# Patient Record
Sex: MALE | Race: WHITE | Employment: FULL TIME | URBAN - METROPOLITAN AREA
[De-identification: names, ages, dates, MRNs, and addresses within clinical notes are randomized per-mention and may not be internally consistent; named-entity substitution may affect disease eponyms.]

---

## 2019-03-08 LAB
ALBUMIN SERPL-MCNC: NORMAL G/DL
ALP BLD-CCNC: NORMAL U/L
ALT SERPL-CCNC: NORMAL U/L
ANION GAP SERPL CALCULATED.3IONS-SCNC: NORMAL MMOL/L
AST SERPL-CCNC: NORMAL U/L
BILIRUB SERPL-MCNC: NORMAL MG/DL
BUN BLDV-MCNC: NORMAL MG/DL
CALCIUM SERPL-MCNC: NORMAL MG/DL
CHLORIDE BLD-SCNC: NORMAL MMOL/L
CHOLESTEROL, TOTAL: NORMAL
CHOLESTEROL/HDL RATIO: NORMAL
CO2: NORMAL
CREAT SERPL-MCNC: NORMAL MG/DL
GFR CALCULATED: NORMAL
GLUCOSE BLD-MCNC: NORMAL MG/DL
HDLC SERPL-MCNC: NORMAL MG/DL
LDL CHOLESTEROL CALCULATED: NORMAL
POTASSIUM SERPL-SCNC: NORMAL MMOL/L
SODIUM BLD-SCNC: NORMAL MMOL/L
TOTAL PROTEIN: NORMAL
TRIGL SERPL-MCNC: NORMAL MG/DL
VLDLC SERPL CALC-MCNC: NORMAL MG/DL

## 2019-05-21 ENCOUNTER — OFFICE VISIT (OUTPATIENT)
Dept: PODIATRY | Age: 59
End: 2019-05-21
Payer: COMMERCIAL

## 2019-05-21 VITALS
TEMPERATURE: 97.8 F | DIASTOLIC BLOOD PRESSURE: 78 MMHG | SYSTOLIC BLOOD PRESSURE: 139 MMHG | BODY MASS INDEX: 27.17 KG/M2 | HEIGHT: 73 IN | WEIGHT: 205 LBS

## 2019-05-21 DIAGNOSIS — M79.675 PAIN IN LEFT TOE(S): Primary | ICD-10-CM

## 2019-05-21 DIAGNOSIS — M89.8X7 EXOSTOSIS OF BONE OF FOOT: ICD-10-CM

## 2019-05-21 DIAGNOSIS — M79.674 PAIN IN TOE OF RIGHT FOOT: ICD-10-CM

## 2019-05-21 DIAGNOSIS — L84 CORN OR CALLUS: ICD-10-CM

## 2019-05-21 PROCEDURE — 99203 OFFICE O/P NEW LOW 30 MIN: CPT | Performed by: PODIATRIST

## 2019-05-21 RX ORDER — AMMONIUM LACTATE 12 G/100G
LOTION TOPICAL
Qty: 222 BOTTLE | Refills: 1 | Status: SHIPPED | OUTPATIENT
Start: 2019-05-21

## 2019-05-21 ASSESSMENT — ENCOUNTER SYMPTOMS
EYES NEGATIVE: 1
RESPIRATORY NEGATIVE: 1

## 2019-05-21 NOTE — PROGRESS NOTES
[address] [facility] PODIATRY  [address]  [facility] [address]  Dept: [phone]  Dept Fax: [phone]    NEW PATIENT PROGRESS NOTE  Date of patient's visit: 5/21/2019  Patient's Name:  Piter Matson YOB: 1960            Patient Care Team:  Jose Song MD as PCP - General (Internal Medicine)        Chief Complaint   Patient presents with    Toe Pain     new pt referred by Dr. Rk Medrano for toe pain spin callous's b/l         HPI:   Piter Matson is a 66 y.o. male who presents to the office today complaining of patient states that both large toes are painful this is been painful for many years patient states that he stands at a [employer] shop wearing steel toed shoes, noted pain mostly after he's been wearing boots and standing. Symptoms began year year(s) ago. Patient relates pain is Present. Pain is rated 10 out of 10 and is described as intermittent. Treatments prior to today's visit include: none. Currently denies F/C/N/V. Pt's primary care physician is Jose Song MD last seen3-6-18     No Known Allergies    Past Medical History:   Diagnosis Date    Skin lesion     perianal       Prior to Admission medications    Medication Sig Start Date End Date Taking? Authorizing Provider   ammonium lactate (LAC-HYDRIN) 12 % lotion Apply topically daily. 5/21/19  Yes Jin Rankin DPM   senna-docusate (Har[place] Bound) 8.6-50 MG per tablet Take 2 tablets by mouth daily as needed for Constipation (Take with pain medication to avoid constipation) 2/7/17   Lamonte Miles MD       Past Surgical History:   Procedure Laterality Date    COLONOSCOPY      OTHER SURGICAL HISTORY  02/07/2017    excision perianal skin lesion    PILONIDAL CYST EXCISION      WISDOM TOOTH EXTRACTION         No family history on file.     Social History     Tobacco Use    Smoking status: Current Some Day Smoker     Types: Cigars    Smokeless tobacco: lesion/ulceration Present. Skin No rashes or nodules noted. .   Musculoskeletal:     1st MPJ ROM within normal limits, Bilateral.  Muscle strength 5/5, Bilateral.  Pain present upon palpation of bilateral hallux, Bilateral. within normal limits medial longitudinal arch, Bilateral.  Ankle ROM within normal limits,Bilateral.    Dorsally contracted digits absent digits 0, Bilateral.   Vascular: DP and PT pulses palpable 4/4, Bilateral.  CFT <2 seconds, Bilateral.  Hair growth present to the level of the digits, Bilateral.  Edema absent, Bilateral.  Varicosities absent, Bilateral. Erythema present, Bilateral      Radiographs:      Asessment: Patient is a 66 y.o. male with:    Diagnosis Orders   1. Pain in left toe(s)     2. Pain in toe of right foot     3. Corn or callus     4. Exostosis of bone of foot         Plan: Patient examined and evaluated. Current condition and treatment options discussed in detail. Discussed conservative and surgical options with the patient. Advised pt to wear a wide shoe today we divided the lesions down to good healthy tissue I prescribed Lac-Hydrin cream to be applied daily along with the mole skin  . Verbal and written instructions given to patient. Contact office with any questions/problems/concerns.   RTC in 2month(s).    5/21/2019    Electronically signed by Erso Littlejohn DPM on 5/21/2019 at 5:36 PM  5/21/2019

## 2019-07-23 ENCOUNTER — PROCEDURE VISIT (OUTPATIENT)
Dept: PODIATRY | Age: 59
End: 2019-07-23
Payer: COMMERCIAL

## 2019-07-23 VITALS
SYSTOLIC BLOOD PRESSURE: 146 MMHG | HEIGHT: 73 IN | WEIGHT: 200.4 LBS | TEMPERATURE: 96.3 F | BODY MASS INDEX: 26.56 KG/M2 | DIASTOLIC BLOOD PRESSURE: 70 MMHG

## 2019-07-23 DIAGNOSIS — M79.675 PAIN IN LEFT TOE(S): Primary | ICD-10-CM

## 2019-07-23 DIAGNOSIS — M79.674 PAIN IN TOE OF RIGHT FOOT: ICD-10-CM

## 2019-07-23 PROCEDURE — 99213 OFFICE O/P EST LOW 20 MIN: CPT | Performed by: PODIATRIST

## 2019-09-24 ENCOUNTER — OFFICE VISIT (OUTPATIENT)
Dept: PODIATRY | Age: 59
End: 2019-09-24
Payer: COMMERCIAL

## 2019-09-24 VITALS
TEMPERATURE: 98.2 F | WEIGHT: 204 LBS | DIASTOLIC BLOOD PRESSURE: 64 MMHG | BODY MASS INDEX: 27.04 KG/M2 | SYSTOLIC BLOOD PRESSURE: 122 MMHG | HEIGHT: 73 IN

## 2019-09-24 DIAGNOSIS — M79.674 PAIN IN TOE OF RIGHT FOOT: ICD-10-CM

## 2019-09-24 DIAGNOSIS — L84 CORN OR CALLUS: ICD-10-CM

## 2019-09-24 DIAGNOSIS — M89.8X7 EXOSTOSIS OF BONE OF FOOT: ICD-10-CM

## 2019-09-24 DIAGNOSIS — M79.675 PAIN IN LEFT TOE(S): Primary | ICD-10-CM

## 2019-09-24 PROCEDURE — 99213 OFFICE O/P EST LOW 20 MIN: CPT | Performed by: PODIATRIST

## 2019-09-24 NOTE — PROGRESS NOTES
moleskin foam padding and an orthotic    Return in about 3 months (around 12/24/2019). Seen By:  Natalia Peoples DPM      Document was created using voice recognition software. Note was reviewed, however may contain grammatical errors.

## 2019-11-26 ENCOUNTER — PROCEDURE VISIT (OUTPATIENT)
Dept: PODIATRY | Age: 59
End: 2019-11-26
Payer: COMMERCIAL

## 2019-11-26 VITALS
TEMPERATURE: 97.8 F | BODY MASS INDEX: 27.57 KG/M2 | DIASTOLIC BLOOD PRESSURE: 74 MMHG | SYSTOLIC BLOOD PRESSURE: 136 MMHG | WEIGHT: 208 LBS | HEIGHT: 73 IN

## 2019-11-26 DIAGNOSIS — M79.675 PAIN IN LEFT TOE(S): Primary | ICD-10-CM

## 2019-11-26 DIAGNOSIS — M89.8X7 EXOSTOSIS OF BONE OF FOOT: ICD-10-CM

## 2019-11-26 DIAGNOSIS — L84 CORN OR CALLUS: ICD-10-CM

## 2019-11-26 DIAGNOSIS — M79.674 PAIN IN TOE OF RIGHT FOOT: ICD-10-CM

## 2019-11-26 PROCEDURE — 99213 OFFICE O/P EST LOW 20 MIN: CPT | Performed by: PODIATRIST

## 2020-02-25 ENCOUNTER — PROCEDURE VISIT (OUTPATIENT)
Dept: PODIATRY | Age: 60
End: 2020-02-25
Payer: COMMERCIAL

## 2020-02-25 VITALS
BODY MASS INDEX: 26.56 KG/M2 | SYSTOLIC BLOOD PRESSURE: 139 MMHG | DIASTOLIC BLOOD PRESSURE: 82 MMHG | WEIGHT: 207 LBS | HEIGHT: 74 IN | TEMPERATURE: 96.9 F

## 2020-02-25 PROCEDURE — 99213 OFFICE O/P EST LOW 20 MIN: CPT | Performed by: PODIATRIST

## 2020-02-25 NOTE — PROGRESS NOTES
19  Juana Confer : 1960 Sex: male  Age: 64 y.o. Patient was referred by Ryan Lares MD    Chief Complaint   Patient presents with   Bernerd Foot     saw PCP DR Izaiah Yarbrough on 2019       HPI: pt is seen for follow-up of painful feet patient has multiple areas that are sore including the great toes and plantar aspect heels patient has been using moleskin that helps a lot    ROS:  Const: Denies constitutional symptoms  Musculo: Denies symptoms other than stated above  Skin: Denies symptoms other than stated above       Current Outpatient Medications:     ammonium lactate (LAC-HYDRIN) 12 % lotion, Apply topically daily. , Disp: 222 Bottle, Rfl: 1  No Known Allergies    Past Medical History:   Diagnosis Date    Skin lesion     perianal     Past Surgical History:   Procedure Laterality Date    COLONOSCOPY      NOSE SURGERY      deviated septum     OTHER SURGICAL HISTORY  2017    excision perianal skin lesion    PILONIDAL CYST EXCISION      WISDOM TOOTH EXTRACTION       Family History   Problem Relation Age of Onset    High Blood Pressure Mother     High Cholesterol Mother     Coronary Art Dis Father     High Blood Pressure Father     High Cholesterol Father     Diabetes Father         Insulin Dependent      Social History     Socioeconomic History    Marital status:      Spouse name: Not on file    Number of children: Not on file    Years of education: Not on file    Highest education level: Not on file   Occupational History    Not on file   Social Needs    Financial resource strain: Not on file    Food insecurity:     Worry: Not on file     Inability: Not on file    Transportation needs:     Medical: Not on file     Non-medical: Not on file   Tobacco Use    Smoking status: Current Some Day Smoker     Types: Cigars    Smokeless tobacco: Current User     Types: Chew   Substance and Sexual Activity    Alcohol use: Yes     Comment: occ    Drug use: No    Sexual activity: Not on file   Lifestyle    Physical activity:     Days per week: Not on file     Minutes per session: Not on file    Stress: Not on file   Relationships    Social connections:     Talks on phone: Not on file     Gets together: Not on file     Attends Yazidism service: Not on file     Active member of club or organization: Not on file     Attends meetings of clubs or organizations: Not on file     Relationship status: Not on file    Intimate partner violence:     Fear of current or ex partner: Not on file     Emotionally abused: Not on file     Physically abused: Not on file     Forced sexual activity: Not on file   Other Topics Concern    Not on file   Social History Narrative    Not on file       Vitals:    02/25/20 1605   BP: 139/82   Temp: 96.9 °F (36.1 °C)   TempSrc: Temporal   Weight: 207 lb (93.9 kg)   Height: 6' 2\" (1.88 m)       Focused Lower Extremity Physical Exam:  Vitals:    02/25/20 1605   BP: 139/82   Temp: 96.9 °F (36.1 °C)        Foot Exam    General  General Appearance: appears stated age and healthy   Orientation: alert and oriented to person, place, and time       Right Foot/Ankle     Inspection and Palpation  Tenderness: great toe interphalangeal joint, great toe metatarsophalangeal joint, lesser metatarsophalangeal joints and metatarsals   Hallux valgus: yes  Skin Exam: callus; Neurovascular  Dorsalis pedis: 3+  Posterior tibial: 3+  Saphenous nerve sensation: normal  Tibial nerve sensation: normal  Superficial peroneal nerve sensation: normal  Deep peroneal nerve sensation: normal  Sural nerve sensation: normal      Left Foot/Ankle      Inspection and Palpation  Tenderness: great toe interphalangeal joint, great toe metatarsophalangeal joint, lesser metatarsophalangeal joints and metatarsals   Hallux valgus: yes  Skin Exam: callus;      Neurovascular  Dorsalis pedis: 3+  Posterior tibial: 3+  Saphenous nerve sensation: normal  Tibial nerve sensation: normal  Superficial peroneal nerve sensation: normal  Deep peroneal nerve sensation: normal  Sural nerve sensation: normal             Ortho Exam    Vascular: pulses  dp  pt    Capillary Refill Time:   Hair growth  Skin:    Edema:    Neurologic:      Musculoskeletal/ Orthopedic examination: Pain with palpation to the first metatarsal phalangeal joint pain with palpation to the interphalangeal joints bilaterally there is a dry callus plantar aspect of bilateral heels and sub-1 bilaterally  NAIL   Web space   Derm:          Assessment and Plan:  I had a long discussion today with the patient about the likely diagnosis and its natural history, physical exam and imaging findings, as well as treatment options. We discussed both surgical and nonsurgical treatments, including risks and benefits. From a nonoperative standpoint, we discussed rest/activity modification, NSAIDs/Acetaminophen/topical anesthetics, orthotics, bracing/immobilization, and physical therapy. After conservative treatment has failed patient agrees and would like to have surgery today we reviewed all risk and benefits all complications including anesthesia infection further surgery over under correction patient agrees to proceed with surgery in the future at [facility] was seen today for callouses. Diagnoses and all orders for this visit:    Pain in left toe(s)    Pain in toe of right foot    Corn or callus    Exostosis of bone of foot    Today I debrided the calluses of the patient is to continue with moleskin foam padding and an orthotic    Return in about 4 months (around 6/25/2020). Seen By:  Caity Ferrell DPM      Document was created using voice recognition software. Note was reviewed, however may contain grammatical errors.

## 2020-03-05 ENCOUNTER — HOSPITAL ENCOUNTER (EMERGENCY)
Age: 60
Discharge: HOME | End: 2020-03-05
Attending: EMERGENCY MEDICINE
Payer: COMMERCIAL

## 2020-03-05 ENCOUNTER — OFFICE VISIT (OUTPATIENT)
Dept: PRIMARY CARE CLINIC | Age: 60
End: 2020-03-05
Payer: COMMERCIAL

## 2020-03-05 ENCOUNTER — APPOINTMENT (OUTPATIENT)
Dept: GENERAL RADIOLOGY | Age: 60
End: 2020-03-05
Payer: COMMERCIAL

## 2020-03-05 VITALS
BODY MASS INDEX: 25.72 KG/M2 | DIASTOLIC BLOOD PRESSURE: 68 MMHG | OXYGEN SATURATION: 98 % | HEART RATE: 98 BPM | SYSTOLIC BLOOD PRESSURE: 128 MMHG | WEIGHT: 200.4 LBS | TEMPERATURE: 101.3 F | RESPIRATION RATE: 18 BRPM | HEIGHT: 74 IN

## 2020-03-05 VITALS
TEMPERATURE: 98.9 F | BODY MASS INDEX: 26.51 KG/M2 | HEIGHT: 73 IN | WEIGHT: 200 LBS | DIASTOLIC BLOOD PRESSURE: 60 MMHG | OXYGEN SATURATION: 98 % | SYSTOLIC BLOOD PRESSURE: 148 MMHG | HEART RATE: 84 BPM | RESPIRATION RATE: 16 BRPM

## 2020-03-05 LAB
ANION GAP SERPL CALCULATED.3IONS-SCNC: 12 MMOL/L (ref 7–16)
BASOPHILS ABSOLUTE: 0.02 E9/L (ref 0–0.2)
BASOPHILS RELATIVE PERCENT: 0.3 % (ref 0–2)
BUN BLDV-MCNC: 20 MG/DL (ref 6–20)
CALCIUM SERPL-MCNC: 8.5 MG/DL (ref 8.6–10.2)
CHLORIDE BLD-SCNC: 103 MMOL/L (ref 98–107)
CO2: 24 MMOL/L (ref 22–29)
CREAT SERPL-MCNC: 1.2 MG/DL (ref 0.7–1.2)
EKG ATRIAL RATE: 82 BPM
EKG P AXIS: 64 DEGREES
EKG P-R INTERVAL: 160 MS
EKG Q-T INTERVAL: 352 MS
EKG QRS DURATION: 88 MS
EKG QTC CALCULATION (BAZETT): 411 MS
EKG T AXIS: 48 DEGREES
EKG VENTRICULAR RATE: 82 BPM
EOSINOPHILS ABSOLUTE: 0 E9/L (ref 0.05–0.5)
EOSINOPHILS RELATIVE PERCENT: 0 % (ref 0–6)
GFR AFRICAN AMERICAN: >60
GFR NON-AFRICAN AMERICAN: >60 ML/MIN/1.73
GLUCOSE BLD-MCNC: 104 MG/DL (ref 74–99)
HCT VFR BLD CALC: 43.1 % (ref 37–54)
HEMOGLOBIN: 14.1 G/DL (ref 12.5–16.5)
IMMATURE GRANULOCYTES #: 0.02 E9/L
IMMATURE GRANULOCYTES %: 0.3 % (ref 0–5)
INFLUENZA A ANTIGEN, POC: POSITIVE
INFLUENZA B ANTIGEN, POC: ABNORMAL
LYMPHOCYTES ABSOLUTE: 0.79 E9/L (ref 1.5–4)
LYMPHOCYTES RELATIVE PERCENT: 10.3 % (ref 20–42)
MCH RBC QN AUTO: 30.8 PG (ref 26–35)
MCHC RBC AUTO-ENTMCNC: 32.7 % (ref 32–34.5)
MCV RBC AUTO: 94.1 FL (ref 80–99.9)
MONOCYTES ABSOLUTE: 0.82 E9/L (ref 0.1–0.95)
MONOCYTES RELATIVE PERCENT: 10.6 % (ref 2–12)
NEUTROPHILS ABSOLUTE: 6.05 E9/L (ref 1.8–7.3)
NEUTROPHILS RELATIVE PERCENT: 78.5 % (ref 43–80)
PDW BLD-RTO: 12 FL (ref 11.5–15)
PLATELET # BLD: 180 E9/L (ref 130–450)
PMV BLD AUTO: 9.6 FL (ref 7–12)
POTASSIUM SERPL-SCNC: 4.3 MMOL/L (ref 3.5–5)
RBC # BLD: 4.58 E12/L (ref 3.8–5.8)
SODIUM BLD-SCNC: 139 MMOL/L (ref 132–146)
TROPONIN: <0.01 NG/ML (ref 0–0.03)
WBC # BLD: 7.7 E9/L (ref 4.5–11.5)

## 2020-03-05 PROCEDURE — 99284 EMERGENCY DEPT VISIT MOD MDM: CPT

## 2020-03-05 PROCEDURE — 96374 THER/PROPH/DIAG INJ IV PUSH: CPT

## 2020-03-05 PROCEDURE — 85025 COMPLETE CBC W/AUTO DIFF WBC: CPT

## 2020-03-05 PROCEDURE — 93005 ELECTROCARDIOGRAM TRACING: CPT | Performed by: EMERGENCY MEDICINE

## 2020-03-05 PROCEDURE — 96372 THER/PROPH/DIAG INJ SC/IM: CPT | Performed by: INTERNAL MEDICINE

## 2020-03-05 PROCEDURE — 99213 OFFICE O/P EST LOW 20 MIN: CPT | Performed by: INTERNAL MEDICINE

## 2020-03-05 PROCEDURE — 71045 X-RAY EXAM CHEST 1 VIEW: CPT

## 2020-03-05 PROCEDURE — 6360000002 HC RX W HCPCS: Performed by: EMERGENCY MEDICINE

## 2020-03-05 PROCEDURE — 84484 ASSAY OF TROPONIN QUANT: CPT

## 2020-03-05 PROCEDURE — 87804 INFLUENZA ASSAY W/OPTIC: CPT | Performed by: INTERNAL MEDICINE

## 2020-03-05 PROCEDURE — 2580000003 HC RX 258: Performed by: EMERGENCY MEDICINE

## 2020-03-05 PROCEDURE — 80048 BASIC METABOLIC PNL TOTAL CA: CPT

## 2020-03-05 PROCEDURE — 6370000000 HC RX 637 (ALT 250 FOR IP): Performed by: EMERGENCY MEDICINE

## 2020-03-05 RX ORDER — 0.9 % SODIUM CHLORIDE 0.9 %
1000 INTRAVENOUS SOLUTION INTRAVENOUS ONCE
Status: COMPLETED | OUTPATIENT
Start: 2020-03-05 | End: 2020-03-05

## 2020-03-05 RX ORDER — OSELTAMIVIR PHOSPHATE 75 MG/1
75 CAPSULE ORAL 2 TIMES DAILY
Qty: 10 CAPSULE | Refills: 0 | Status: SHIPPED | OUTPATIENT
Start: 2020-03-05 | End: 2020-03-10

## 2020-03-05 RX ORDER — OSELTAMIVIR PHOSPHATE 75 MG/1
75 CAPSULE ORAL ONCE
Status: COMPLETED | OUTPATIENT
Start: 2020-03-05 | End: 2020-03-05

## 2020-03-05 RX ORDER — KETOROLAC TROMETHAMINE 30 MG/ML
30 INJECTION, SOLUTION INTRAMUSCULAR; INTRAVENOUS ONCE
Status: COMPLETED | OUTPATIENT
Start: 2020-03-05 | End: 2020-03-05

## 2020-03-05 RX ORDER — SODIUM CHLORIDE 0.9 % (FLUSH) 0.9 %
10 SYRINGE (ML) INJECTION PRN
Status: DISCONTINUED | OUTPATIENT
Start: 2020-03-05 | End: 2020-03-05 | Stop reason: HOSPADM

## 2020-03-05 RX ORDER — LORAZEPAM 1 MG/1
TABLET ORAL DAILY PRN
COMMUNITY
Start: 2019-12-09

## 2020-03-05 RX ORDER — METHYLPREDNISOLONE ACETATE 40 MG/ML
40 INJECTION, SUSPENSION INTRA-ARTICULAR; INTRALESIONAL; INTRAMUSCULAR; SOFT TISSUE ONCE
Status: COMPLETED | OUTPATIENT
Start: 2020-03-05 | End: 2020-03-05

## 2020-03-05 RX ADMIN — SODIUM CHLORIDE 1000 ML: 9 INJECTION, SOLUTION INTRAVENOUS at 12:19

## 2020-03-05 RX ADMIN — KETOROLAC TROMETHAMINE 30 MG: 30 INJECTION, SOLUTION INTRAMUSCULAR; INTRAVENOUS at 13:52

## 2020-03-05 RX ADMIN — METHYLPREDNISOLONE ACETATE 40 MG: 40 INJECTION, SUSPENSION INTRA-ARTICULAR; INTRALESIONAL; INTRAMUSCULAR; SOFT TISSUE at 10:38

## 2020-03-05 RX ADMIN — OSELTAMIVIR PHOSPHATE 75 MG: 75 CAPSULE ORAL at 13:52

## 2020-03-05 ASSESSMENT — PATIENT HEALTH QUESTIONNAIRE - PHQ9
2. FEELING DOWN, DEPRESSED OR HOPELESS: 0
SUM OF ALL RESPONSES TO PHQ QUESTIONS 1-9: 0
SUM OF ALL RESPONSES TO PHQ QUESTIONS 1-9: 0
1. LITTLE INTEREST OR PLEASURE IN DOING THINGS: 0
SUM OF ALL RESPONSES TO PHQ9 QUESTIONS 1 & 2: 0

## 2020-03-05 ASSESSMENT — PAIN SCALES - GENERAL: PAINLEVEL_OUTOF10: 2

## 2020-03-05 NOTE — ED PROVIDER NOTES
Department of Emergency Medicine   ED  Provider Note  Admit Date/RoomTime: 3/5/2020 11:07 AM  ED Room: RANI/RANI      History of Present Illness:  3/5/20, Time: 11:58 AM         Su Perez is a 64 y.o. male presenting to the ED for loss of consciousness, beginning this morning. The complaint has been persistent, moderate in severity, and worsened by nothing. Pt comes to the ED after being at urgent care this morning where he felt lightheaded and dizzy and then had two syncopic episodes. Pt has tested positive for influenza A and has a cough and a fever. Pt denies chest pain, shortness of breath, chills, abdominal pain, nausea, emesis, diarrhea, constipation, urinary symptoms or further complaints at this time. Review of Systems:   Pertinent positives and negatives are stated within HPI, all other systems reviewed and are negative.    --------------------------------------------- PAST HISTORY ---------------------------------------------  Past Medical History:  has a past medical history of Skin lesion. Past Surgical History:  has a past surgical history that includes [place] tooth extraction; Pilonidal cyst excision; Colonoscopy; other surgical history (02/07/2017); and Nose surgery. Social History:  reports that he has never smoked. He has never used smokeless tobacco. He reports current alcohol use. He reports that he does not use drugs. Family History: family history includes Coronary Art Dis in his father; Diabetes in his father; High Blood Pressure in his father and mother; High Cholesterol in his father and mother. The patients home medications have been reviewed. Allergies: Patient has no known allergies.     ---------------------------------------------------PHYSICAL EXAM--------------------------------------    Constitutional/General: Alert and oriented x3, ill-appearing, febrile  Head: Normocephalic and atraumatic  Eyes: PERRL, EOMI, conjunctiva normal, sclera non icteric  Respiratory: Lungs clear to auscultation bilaterally, no wheezes, rales, or rhonchi. Not in respiratory distress  Cardiovascular:  Regular rate. Regular rhythm. No murmurs, gallops, or rubs. 2+ distal pulses  Chest: No chest wall tenderness  GI:  Abdomen Soft, Non tender, Non distended. +BS. No rebound, guarding, or rigidity. No pulsatile masses. Musculoskeletal: Moves all extremities x 4. Warm and well perfused, no clubbing, cyanosis, or edema. Integument: Skin warm and dry. No rashes. Neurologic: GCS 15, no focal deficits, symmetric strength 5/5 in the upper and lower extremities bilaterally    -------------------------------------------------- RESULTS -------------------------------------------------  I have personally reviewed all laboratory and imaging results for this patient. Results are listed below.      LABS:  Results for orders placed or performed during the hospital encounter of 03/05/20   CBC Auto Differential   Result Value Ref Range    WBC 7.7 4.5 - 11.5 E9/L    RBC 4.58 3.80 - 5.80 E12/L    Hemoglobin 14.1 12.5 - 16.5 g/dL    Hematocrit 43.1 37.0 - 54.0 %    MCV 94.1 80.0 - 99.9 fL    MCH 30.8 26.0 - 35.0 pg    MCHC 32.7 32.0 - 34.5 %    RDW 12.0 11.5 - 15.0 fL    Platelets 237 854 - 882 E9/L    MPV 9.6 7.0 - 12.0 fL    Neutrophils % 78.5 43.0 - 80.0 %    Immature Granulocytes % 0.3 0.0 - 5.0 %    Lymphocytes % 10.3 (L) 20.0 - 42.0 %    Monocytes % 10.6 2.0 - 12.0 %    Eosinophils % 0.0 0.0 - 6.0 %    Basophils % 0.3 0.0 - 2.0 %    Neutrophils Absolute 6.05 1.80 - 7.30 E9/L    Immature Granulocytes # 0.02 E9/L    Lymphocytes Absolute 0.79 (L) 1.50 - 4.00 E9/L    Monocytes Absolute 0.82 0.10 - 0.95 E9/L    Eosinophils Absolute 0.00 (L) 0.05 - 0.50 E9/L    Basophils Absolute 0.02 0.00 - 0.20 W6/I   Basic Metabolic Panel   Result Value Ref Range    Sodium 139 132 - 146 mmol/L    Potassium 4.3 3.5 - 5.0 mmol/L    Chloride 103 98 - 107 mmol/L    CO2 24 22 - 29 mmol/L    Anion Gap 12 7 - 16 mmol/L    Glucose 104 (H) 74 - 99 mg/dL    BUN 20 6 - 20 mg/dL    CREATININE 1.2 0.7 - 1.2 mg/dL    GFR Non-African American >60 >=60 mL/min/1.73    GFR African American >60     Calcium 8.5 (L) 8.6 - 10.2 mg/dL   Troponin   Result Value Ref Range    Troponin <0.01 0.00 - 0.03 ng/mL   EKG 12 Lead   Result Value Ref Range    Ventricular Rate 82 BPM    Atrial Rate 82 BPM    P-R Interval 160 ms    QRS Duration 88 ms    Q-T Interval 352 ms    QTc Calculation (Bazett) 411 ms    P Axis 64 degrees    T Axis 48 degrees       RADIOLOGY:  Interpreted by Radiologist.  XR CHEST PORTABLE   Final Result      NO ACUTE CARDIOPULMONARY PROCESS           ------------------------- NURSING NOTES AND VITALS REVIEWED ---------------------------   The nursing notes within the ED encounter and vital signs as below have been reviewed by myself. BP (!) 148/60   Pulse 84   Temp 98.9 °F (37.2 °C) (Oral)   Resp 16   Ht 6' 1\" (1.854 m)   Wt 200 lb (90.7 kg)   SpO2 98%   BMI 26.39 kg/m²   Oxygen Saturation Interpretation: Normal    The patients available past medical records and past encounters were reviewed. ------------------------------ ED COURSE/MEDICAL DECISION MAKING----------------------  Medications   0.9 % sodium chloride bolus (0 mLs Intravenous Stopped 3/5/20 1418)   0.9 % sodium chloride bolus (0 mLs Intravenous Stopped 3/5/20 1418)   ketorolac (TORADOL) injection 30 mg (30 mg Intravenous Given 3/5/20 1352)   oseltamivir (TAMIFLU) capsule 75 mg (75 mg Oral Given 3/5/20 1352)       Medical Decision Making:    Patient was seen and examined. Labs and imaging were evaluated. Patient already had diagnosis of influenza a at urgent care and had a syncopal episode after he became lightheaded at the registration desk when he was leaving. Patient is febrile here in the department Toradol was given as well as an initial dose of Tamiflu. Patient was orthostatic positive and IV fluids were given.   Patient was able to ambulate

## 2020-03-05 NOTE — LETTER
[facility] PRIMARY CARE  [address]  Phone: [phone]  Fax: [phone]    Ellen Foster MD        March 5, 2020     Patient: Amari Potter   YOB: 1960   Date of Visit: 3/5/2020       To Whom It May Concern: It is my medical opinion that Ramilamary Adama may return to full duty with no restrictions on 3/11/2020    If you have any questions or concerns, please don't hesitate to call.     Sincerely,        Ellen Foster MD

## 2020-03-05 NOTE — ED NOTES
Bed: 18  Expected date:   Expected time:   Means of arrival:   Comments:  Kam Chiu RN  03/05/20 8996

## 2020-03-05 NOTE — PROGRESS NOTES
Not on file    Number of children: Not on file    Years of education: Not on file    Highest education level: Not on file   Occupational History    Not on file   Social Needs    Financial resource strain: Not on file    Food insecurity:     Worry: Not on file     Inability: Not on file    Transportation needs:     Medical: Not on file     Non-medical: Not on file   Tobacco Use    Smoking status: Never Smoker    Smokeless tobacco: Never Used   Substance and Sexual Activity    Alcohol use: Yes     Comment: occ    Drug use: No    Sexual activity: Not on file   Lifestyle    Physical activity:     Days per week: Not on file     Minutes per session: Not on file    Stress: Not on file   Relationships    Social connections:     Talks on phone: Not on file     Gets together: Not on file     Attends Yarsani service: Not on file     Active member of club or organization: Not on file     Attends meetings of clubs or organizations: Not on file     Relationship status: Not on file    Intimate partner violence:     Fear of current or ex partner: Not on file     Emotionally abused: Not on file     Physically abused: Not on file     Forced sexual activity: Not on file   Other Topics Concern    Not on file   Social History Narrative    Not on file       Vitals:    03/05/20 0957   BP: 128/68   Site: Left Upper Arm   Position: Sitting   Cuff Size: Large Adult   Pulse: 98   Resp: 18   Temp: 101.3 °F (38.5 °C)   TempSrc: Oral   SpO2: 98%   Weight: 200 lb 6.4 oz (90.9 kg)   Height: 6' 2\" (1.88 m)       Exam:  Physical Exam  Vitals signs reviewed. Constitutional:       Appearance: He is well-developed. He is ill-appearing. HENT:      Head: Normocephalic and atraumatic. Right Ear: External ear normal.      Left Ear: External ear normal.      Nose: Congestion and rhinorrhea present. Mouth/Throat:      Pharynx: Posterior oropharyngeal erythema present.    Eyes:      Pupils: Pupils are equal, round, and reactive to light. Neck:      Musculoskeletal: Normal range of motion and neck supple. Thyroid: No thyromegaly. Cardiovascular:      Rate and Rhythm: Normal rate and regular rhythm. Heart sounds: Normal heart sounds. No murmur. Pulmonary:      Effort: Pulmonary effort is normal.      Breath sounds: Normal breath sounds. No wheezing or rales. Abdominal:      General: Bowel sounds are normal.      Palpations: Abdomen is soft. Tenderness: There is no abdominal tenderness. There is no guarding or rebound. Musculoskeletal: Normal range of motion. Lymphadenopathy:      Cervical: No cervical adenopathy. Skin:     General: Skin is warm and dry. Neurological:      Mental Status: He is alert and oriented to person, place, and time. Cranial Nerves: No cranial nerve deficit. Psychiatric:         Judgment: Judgment normal.         Assessment and Plan:   Twin Jay was seen today for congestion, cough, headache, generalized body aches and pharyngitis. Diagnoses and all orders for this visit:    Influenza A  Comments:  start tamiflu   depo medrol 40mg im x 1   fluids   rest   diet as tolerated  tylenol and ibuprofen   Orders:  -     methylPREDNISolone acetate (DEPO-MEDROL) injection 40 mg  -     oseltamivir (TAMIFLU) 75 MG capsule; Take 1 capsule by mouth 2 times daily for 5 days  -     POCT Influenza A/B Antigen (BD Veritor)    Family hx of ischem heart dis and oth dis of the circ sys  -     Comprehensive Metabolic Panel; Future  -     Magnesium; Future  -     Lipid, Fasting; Future  -     CBC Auto Differential; Future    Other fatigue  -     TSH without Reflex; Future  -     Urinalysis; Future    Screening for malignant neoplasm of prostate  -     Psa screening; Future    Vitamin D deficiency  -     Vitamin D 25 Hydroxy; Future      Follow-up with your primary care provider in 7-10 days for re-evaluation and further management.  Return  sooner or go to the Emergency Department immediately if

## 2020-03-11 ENCOUNTER — OFFICE VISIT (OUTPATIENT)
Dept: FAMILY MEDICINE CLINIC | Age: 60
End: 2020-03-11
Payer: COMMERCIAL

## 2020-03-11 VITALS
TEMPERATURE: 97.5 F | DIASTOLIC BLOOD PRESSURE: 80 MMHG | SYSTOLIC BLOOD PRESSURE: 124 MMHG | WEIGHT: 201.13 LBS | BODY MASS INDEX: 25.81 KG/M2 | OXYGEN SATURATION: 97 % | HEIGHT: 74 IN | HEART RATE: 48 BPM

## 2020-03-11 PROBLEM — R55 SYNCOPE AND COLLAPSE: Status: ACTIVE | Noted: 2020-03-11

## 2020-03-11 PROBLEM — F41.1 GENERALIZED ANXIETY DISORDER: Status: ACTIVE | Noted: 2020-03-11

## 2020-03-11 PROBLEM — J10.1 INFLUENZA DUE TO INFLUENZA A VIRUS: Status: ACTIVE | Noted: 2020-03-11

## 2020-03-11 PROBLEM — F51.5 NIGHTMARE DISORDER: Status: ACTIVE | Noted: 2020-03-11

## 2020-03-11 PROCEDURE — 99213 OFFICE O/P EST LOW 20 MIN: CPT | Performed by: INTERNAL MEDICINE

## 2020-03-11 RX ORDER — FLUTICASONE PROPIONATE 50 MCG
2 SPRAY, SUSPENSION (ML) NASAL DAILY
Qty: 1 BOTTLE | Refills: 5 | Status: SHIPPED | OUTPATIENT
Start: 2020-03-11

## 2020-03-11 ASSESSMENT — ENCOUNTER SYMPTOMS
ABDOMINAL PAIN: 0
VOMITING: 0
SORE THROAT: 0
RHINORRHEA: 1
EYE PAIN: 0
BLOOD IN STOOL: 0
CONSTIPATION: 0
NAUSEA: 0
CHEST TIGHTNESS: 0
COUGH: 1
DIARRHEA: 0
SHORTNESS OF BREATH: 0

## 2020-03-11 NOTE — PROGRESS NOTES
None    Number of children: None    Years of education: None    Highest education level: None   Occupational History    None   Social Needs    Financial resource strain: None    Food insecurity     Worry: None     Inability: None    Transportation needs     Medical: None     Non-medical: None   Tobacco Use    Smoking status: Never Smoker    Smokeless tobacco: Never Used   Substance and Sexual Activity    Alcohol use: Yes     Comment: occ    Drug use: No    Sexual activity: None   Lifestyle    Physical activity     Days per week: None     Minutes per session: None    Stress: None   Relationships    Social connections     Talks on phone: None     Gets together: None     Attends Church service: None     Active member of club or organization: None     Attends meetings of clubs or organizations: None     Relationship status: None    Intimate partner violence     Fear of current or ex partner: None     Emotionally abused: None     Physically abused: None     Forced sexual activity: None   Other Topics Concern    None   Social History Narrative    None        Vitals:    03/11/20 1513   BP: 124/80   Site: Left Upper Arm   Cuff Size: Large Adult   Pulse: (!) 48   Temp: 97.5 °F (36.4 °C)   SpO2: 97%   Weight: 201 lb 2 oz (91.2 kg)   Height: 6' 2\" (1.88 m)       Exam:  Physical Exam  Vitals signs reviewed. Constitutional:       Appearance: He is well-developed. HENT:      Head: Normocephalic and atraumatic. Right Ear: External ear normal.      Left Ear: External ear normal.   Eyes:      Pupils: Pupils are equal, round, and reactive to light. Neck:      Musculoskeletal: Normal range of motion and neck supple. Thyroid: No thyromegaly. Cardiovascular:      Rate and Rhythm: Normal rate and regular rhythm. Heart sounds: Normal heart sounds. No murmur. Pulmonary:      Effort: Pulmonary effort is normal.      Breath sounds: Normal breath sounds. No wheezing or rales.    Abdominal: General: Bowel sounds are normal.      Palpations: Abdomen is soft. Tenderness: There is no abdominal tenderness. There is no guarding or rebound. Musculoskeletal: Normal range of motion. Lymphadenopathy:      Cervical: No cervical adenopathy. Skin:     General: Skin is warm and dry. Neurological:      Mental Status: He is alert and oriented to person, place, and time. Cranial Nerves: No cranial nerve deficit. Psychiatric:         Judgment: Judgment normal.         Assessment and Plan:    Penny Arguelles was seen today for influenza and cough. Diagnoses and all orders for this visit:    Influenza due to influenza A virus  - continue present treatment  - fluids   - completed tamiflu   - antihistamine   - steroid nasal spray     Syncope and collapse  - Continue present treatment   - possible due to dehydration   - resolved     Generalized anxiety disorder  - continue present treatment   - no longer following with psychology  - on lorazepma as needed  - may need SSRI or SNRI - declines     Nightmare disorder  - Sleep issues and nightmares disrupting sleep  - following with psychology  - discussed sleep hygiene  - if no improvement - discussed sleep study for sleep apnea  - stable at present     Mixed hyperlipidemia  - Continue present treatment   - watch diet   - need to calculate 10 year risk   - follow labs     Diverticular disease of colon  - Continue present treatment   - watch diet   - stable   - last colonoscopy  Was 2014     Return in about 1 year (around 3/11/2021) for check up and review. No orders of the defined types were placed in this encounter.     Requested Prescriptions     Signed Prescriptions Disp Refills    fluticasone (FLONASE) 50 MCG/ACT nasal spray 1 Bottle 5     Si sprays by Each Nostril route daily        Rickie Fuentes MD  3/11/2020  4:27 PM    Addendum 2020  Changes were made as a professional courtesy to the patient regarding billing and code charging was changed to a different level.      Electronically signed by Bouchra Fitch MD on 4/27/2020 at 4:14 PM

## 2020-04-17 ENCOUNTER — TELEPHONE (OUTPATIENT)
Dept: PRIMARY CARE CLINIC | Age: 60
End: 2020-04-17

## 2020-04-17 NOTE — TELEPHONE ENCOUNTER
I am not sure what I would do for that to be different -I would need some information further from billing for guidance on this

## 2020-04-17 NOTE — TELEPHONE ENCOUNTER
Pt calling wanting his ov from 03/11 changed come Office Visit to ER Follow up for pt's insurance purposes. I had left a message for Joanne Morales to assist with this but she has not gotten back to me. Would you be able to change this for pt? Please advise. Thank you.

## 2020-06-23 ENCOUNTER — PROCEDURE VISIT (OUTPATIENT)
Dept: PODIATRY | Age: 60
End: 2020-06-23
Payer: COMMERCIAL

## 2020-06-23 VITALS
WEIGHT: 200 LBS | SYSTOLIC BLOOD PRESSURE: 128 MMHG | DIASTOLIC BLOOD PRESSURE: 82 MMHG | TEMPERATURE: 97.5 F | BODY MASS INDEX: 25.68 KG/M2

## 2020-06-23 PROCEDURE — 99213 OFFICE O/P EST LOW 20 MIN: CPT | Performed by: PODIATRIST

## 2020-08-04 ENCOUNTER — OFFICE VISIT (OUTPATIENT)
Dept: FAMILY MEDICINE CLINIC | Age: 60
End: 2020-08-04
Payer: COMMERCIAL

## 2020-08-04 VITALS
DIASTOLIC BLOOD PRESSURE: 90 MMHG | HEART RATE: 54 BPM | WEIGHT: 200 LBS | SYSTOLIC BLOOD PRESSURE: 142 MMHG | BODY MASS INDEX: 25.68 KG/M2 | TEMPERATURE: 97.2 F | OXYGEN SATURATION: 98 %

## 2020-08-04 PROCEDURE — 96372 THER/PROPH/DIAG INJ SC/IM: CPT | Performed by: FAMILY MEDICINE

## 2020-08-04 PROCEDURE — 99213 OFFICE O/P EST LOW 20 MIN: CPT | Performed by: FAMILY MEDICINE

## 2020-08-04 RX ORDER — METHYLPREDNISOLONE 4 MG/1
TABLET ORAL
Qty: 1 KIT | Refills: 0 | Status: SHIPPED
Start: 2020-08-04 | End: 2020-10-27

## 2020-08-04 RX ORDER — TRIAMCINOLONE ACETONIDE 1 MG/G
CREAM TOPICAL
Qty: 1 TUBE | Refills: 0 | Status: SHIPPED
Start: 2020-08-04 | End: 2021-04-22 | Stop reason: ALTCHOICE

## 2020-08-04 RX ORDER — CEFDINIR 300 MG/1
300 CAPSULE ORAL 2 TIMES DAILY
Qty: 20 CAPSULE | Refills: 0 | Status: SHIPPED | OUTPATIENT
Start: 2020-08-04 | End: 2020-08-14

## 2020-08-04 RX ORDER — METHYLPREDNISOLONE ACETATE 80 MG/ML
60 INJECTION, SUSPENSION INTRA-ARTICULAR; INTRALESIONAL; INTRAMUSCULAR; SOFT TISSUE ONCE
Status: COMPLETED | OUTPATIENT
Start: 2020-08-04 | End: 2020-08-04

## 2020-08-04 RX ADMIN — METHYLPREDNISOLONE ACETATE 60 MG: 80 INJECTION, SUSPENSION INTRA-ARTICULAR; INTRALESIONAL; INTRAMUSCULAR; SOFT TISSUE at 08:44

## 2020-08-04 ASSESSMENT — ENCOUNTER SYMPTOMS
EYES NEGATIVE: 1
RESPIRATORY NEGATIVE: 1

## 2020-08-04 NOTE — PROGRESS NOTES
20  Ne Bishop : 1960 Sex: male  Age: 64 y.o. Chief Complaint   Patient presents with    Rash     face and abdomen, x 1 week       This patient is 70-year-old white male with a chief complaint of a pruritic erythematous flat rash currently most of his face and has developed a submandibular tender mass on the right side. Review of Systems   Constitutional: Negative. HENT: Negative. Eyes: Negative. Respiratory: Negative. Cardiovascular: Negative. Skin: Positive for rash. Current Outpatient Medications:     methylPREDNISolone (MEDROL DOSEPACK) 4 MG tablet, Take by mouth., Disp: 1 kit, Rfl: 0    triamcinolone (KENALOG) 0.1 % cream, Apply topically 2 times daily. , Disp: 1 Tube, Rfl: 0    cefdinir (OMNICEF) 300 MG capsule, Take 1 capsule by mouth 2 times daily for 10 days, Disp: 20 capsule, Rfl: 0    ciclopirox (PENLAC) 8 % solution, Apply topically nightly., Disp: 6 mL, Rfl: 2    fluticasone (FLONASE) 50 MCG/ACT nasal spray, 2 sprays by Each Nostril route daily, Disp: 1 Bottle, Rfl: 5    LORazepam (ATIVAN) 1 MG tablet, daily as needed. , Disp: , Rfl:     ammonium lactate (LAC-HYDRIN) 12 % lotion, Apply topically daily. , Disp: 222 Bottle, Rfl: 1  No Known Allergies    Past Medical History:   Diagnosis Date    Skin lesion     perianal     Past Surgical History:   Procedure Laterality Date    COLONOSCOPY      NOSE SURGERY      deviated septum     OTHER SURGICAL HISTORY  2017    excision perianal skin lesion    PILONIDAL CYST EXCISION      WISDOM TOOTH EXTRACTION       Family History   Problem Relation Age of Onset    High Blood Pressure Mother     High Cholesterol Mother     Coronary Art Dis Father     High Blood Pressure Father     High Cholesterol Father     Diabetes Father         Insulin Dependent      Social History     Tobacco Use    Smoking status: Never Smoker    Smokeless tobacco: Never Used   Substance Use Topics    Alcohol use: Yes     Comment: occ    Drug use: No        Vitals:    08/04/20 0826   BP: (!) 142/90   Pulse: 54   Temp: 97.2 °F (36.2 °C)   SpO2: 98%   Weight: 200 lb (90.7 kg)       Physical Exam  Vitals signs reviewed. Constitutional:       Appearance: Normal appearance. HENT:      Head: Normocephalic and atraumatic. Cardiovascular:      Rate and Rhythm: Normal rate and regular rhythm. Heart sounds: No murmur. Pulmonary:      Effort: Pulmonary effort is normal.      Breath sounds: Normal breath sounds. Lymphadenopathy:      Cervical: Cervical adenopathy present. Skin:     Findings: Erythema and rash present. Neurological:      Mental Status: He is alert. Assessment and Plan:  Pito Godfrey was seen today for rash. Diagnoses and all orders for this visit:    Allergic dermatitis due to poison sumac    Lymphadenitis, acute    Other orders  -     methylPREDNISolone (MEDROL DOSEPACK) 4 MG tablet; Take by mouth. -     triamcinolone (KENALOG) 0.1 % cream; Apply topically 2 times daily. -     methylPREDNISolone acetate (DEPO-MEDROL) injection 60 mg  -     cefdinir (OMNICEF) 300 MG capsule; Take 1 capsule by mouth 2 times daily for 10 days        Orders Placed This Encounter   Medications    methylPREDNISolone (MEDROL DOSEPACK) 4 MG tablet     Sig: Take by mouth. Dispense:  1 kit     Refill:  0    triamcinolone (KENALOG) 0.1 % cream     Sig: Apply topically 2 times daily. Dispense:  1 Tube     Refill:  0    methylPREDNISolone acetate (DEPO-MEDROL) injection 60 mg    cefdinir (OMNICEF) 300 MG capsule     Sig: Take 1 capsule by mouth 2 times daily for 10 days     Dispense:  20 capsule     Refill:  0        Patient advised to follow up with PCP as needed.         Seen By:  Shemar Wood DO

## 2020-10-27 ENCOUNTER — PROCEDURE VISIT (OUTPATIENT)
Dept: PODIATRY | Age: 60
End: 2020-10-27
Payer: COMMERCIAL

## 2020-10-27 VITALS
BODY MASS INDEX: 26.19 KG/M2 | SYSTOLIC BLOOD PRESSURE: 126 MMHG | WEIGHT: 204 LBS | TEMPERATURE: 97.1 F | DIASTOLIC BLOOD PRESSURE: 78 MMHG

## 2020-10-27 PROCEDURE — 99213 OFFICE O/P EST LOW 20 MIN: CPT | Performed by: PODIATRIST

## 2020-10-27 NOTE — PROGRESS NOTES
19  Teddy Curry : 1960 Sex: male  Age: 64 y.o. Patient was referred by Bianka Perla MD    Chief Complaint   Patient presents with    Toe Pain     saw pcp Dr. Eddie Figueroa on 3/11/2020    Callouses       HPI: pt is seen for follow-up of painful feet patient has multiple areas that are sore including the great toes and plantar aspect heels patient has been using moleskin that helps a lot    ROS:  Const: Denies constitutional symptoms  Musculo: Denies symptoms other than stated above  Skin: Denies symptoms other than stated above       Current Outpatient Medications:     triamcinolone (KENALOG) 0.1 % cream, Apply topically 2 times daily. , Disp: 1 Tube, Rfl: 0    ciclopirox (PENLAC) 8 % solution, Apply topically nightly., Disp: 6 mL, Rfl: 2    fluticasone (FLONASE) 50 MCG/ACT nasal spray, 2 sprays by Each Nostril route daily, Disp: 1 Bottle, Rfl: 5    LORazepam (ATIVAN) 1 MG tablet, daily as needed. , Disp: , Rfl:     ammonium lactate (LAC-HYDRIN) 12 % lotion, Apply topically daily. , Disp: 222 Bottle, Rfl: 1  No Known Allergies    Past Medical History:   Diagnosis Date    Skin lesion     perianal     Past Surgical History:   Procedure Laterality Date    COLONOSCOPY      NOSE SURGERY      deviated septum     OTHER SURGICAL HISTORY  2017    excision perianal skin lesion    PILONIDAL CYST EXCISION      WISDOM TOOTH EXTRACTION       Family History   Problem Relation Age of Onset    High Blood Pressure Mother     High Cholesterol Mother     Coronary Art Dis Father     High Blood Pressure Father     High Cholesterol Father     Diabetes Father         Insulin Dependent      Social History     Socioeconomic History    Marital status:      Spouse name: Not on file    Number of children: Not on file    Years of education: Not on file    Highest education level: Not on file   Occupational History    Not on file   Social Needs    Financial resource strain: Not on file  Food insecurity     Worry: Not on file     Inability: Not on file    Transportation needs     Medical: Not on file     Non-medical: Not on file   Tobacco Use    Smoking status: Never Smoker    Smokeless tobacco: Never Used   Substance and Sexual Activity    Alcohol use: Yes     Comment: occ    Drug use: No    Sexual activity: Not on file   Lifestyle    Physical activity     Days per week: Not on file     Minutes per session: Not on file    Stress: Not on file   Relationships    Social connections     Talks on phone: Not on file     Gets together: Not on file     Attends [employer] service: Not on file     Active member of club or organization: Not on file     Attends meetings of clubs or organizations: Not on file     Relationship status: Not on file    Intimate partner violence     Fear of current or ex partner: Not on file     Emotionally abused: Not on file     Physically abused: Not on file     Forced sexual activity: Not on file   Other Topics Concern    Not on file   Social History Narrative    Not on file       Vitals:    10/27/20 1531   BP: 126/78   Temp: 97.1 °F (36.2 °C)   TempSrc: Temporal   Weight: 204 lb (92.5 kg)       Focused Lower Extremity Physical Exam:  Vitals:    10/27/20 1531   BP: 126/78   Temp: 97.1 °F (36.2 °C)        Foot Exam    General  General Appearance: appears stated age and healthy   Orientation: alert and oriented to person, place, and time       Right Foot/Ankle     Inspection and Palpation  Tenderness: great toe interphalangeal joint, great toe metatarsophalangeal joint, lesser metatarsophalangeal joints and metatarsals   Hallux valgus: yes  Skin Exam: callus;      Neurovascular  Dorsalis pedis: 3+  Posterior tibial: 3+  Saphenous nerve sensation: normal  Tibial nerve sensation: normal  Superficial peroneal nerve sensation: normal  Deep peroneal nerve sensation: normal  Sural nerve sensation: normal      Left Foot/Ankle      Inspection and Palpation  Tenderness: great toe interphalangeal joint, great toe metatarsophalangeal joint, lesser metatarsophalangeal joints and metatarsals   Hallux valgus: yes  Skin Exam: callus;      Neurovascular  Dorsalis pedis: 3+  Posterior tibial: 3+  Saphenous nerve sensation: normal  Tibial nerve sensation: normal  Superficial peroneal nerve sensation: normal  Deep peroneal nerve sensation: normal  Sural nerve sensation: normal           Toenail Description  Sites of Onychomycosis Involvement (Check affected area)  [x] [x] [x] [x] [x] [x] [x] [x] [x] [x]  5 4 3 2 1 1 2 3 4 5                          Right                                        Left    Thickness  [x] [x] [x] [x] [x] [x] [x] [x] [x] [x]  5 4 3 2 1 1 2 3 4 5                         Right                                        Left    Dystrophic Changes   [x] [x] [x] [x] [x] [x] [x] [x] [x] [x]  5 4 3 2 1 1 2 3 4 5                         Right                                        Left    discolored   [x] [x] [x] [x] [x] [x] [x] [x] [x] [x]  5 4 3 2 1 1 2 3 4 5                          Right                                        Left    Incurvation/Ingrowin   [] [] [] [] [] [] [] [] [] []  5 4 3 2 1 1 2 3 4 5                         Right                                        Left    Inflammation/Pain   [x] [x] [x] [x] [x] [x] [x] [x] [x] [x]  5 4 3 2 1 1 2 3 4 5                         Right                                        Left    Ortho Exam    Vascular: pulses  dp  pt    Capillary Refill Time:   Hair growth  Skin:    Edema:    Neurologic:      Musculoskeletal/ Orthopedic examination: Pain with palpation to the first metatarsal phalangeal joint pain with palpation to the interphalangeal joints bilaterally there is a dry callus plantar aspect of bilateral heels and sub-1 bilaterally  NAIL   Web space   Derm:          Assessment and Plan:new rx  penlac qd at night    I had a long discussion today with the patient about the likely diagnosis and its natural history, physical

## 2021-02-22 ENCOUNTER — TELEPHONE (OUTPATIENT)
Dept: FAMILY MEDICINE CLINIC | Age: 61
End: 2021-02-22

## 2021-02-22 DIAGNOSIS — Z12.5 SCREENING FOR MALIGNANT NEOPLASM OF PROSTATE: ICD-10-CM

## 2021-02-22 DIAGNOSIS — R53.83 OTHER FATIGUE: ICD-10-CM

## 2021-02-22 DIAGNOSIS — E55.9 VITAMIN D DEFICIENCY: ICD-10-CM

## 2021-02-22 DIAGNOSIS — E78.2 MIXED HYPERLIPIDEMIA: Primary | ICD-10-CM

## 2021-02-22 NOTE — TELEPHONE ENCOUNTER
Palmira Vázquez calling about his appointment with you next month. His labs are almost a year old. He is asking if you will need to  Put in a new order for them?

## 2021-04-22 ENCOUNTER — OFFICE VISIT (OUTPATIENT)
Dept: FAMILY MEDICINE CLINIC | Age: 61
End: 2021-04-22
Payer: COMMERCIAL

## 2021-04-22 VITALS
WEIGHT: 197 LBS | TEMPERATURE: 97.8 F | OXYGEN SATURATION: 97 % | HEART RATE: 65 BPM | BODY MASS INDEX: 25.28 KG/M2 | SYSTOLIC BLOOD PRESSURE: 126 MMHG | DIASTOLIC BLOOD PRESSURE: 78 MMHG | HEIGHT: 74 IN

## 2021-04-22 DIAGNOSIS — K57.30 DIVERTICULAR DISEASE OF COLON: ICD-10-CM

## 2021-04-22 DIAGNOSIS — E78.2 MIXED HYPERLIPIDEMIA: ICD-10-CM

## 2021-04-22 DIAGNOSIS — E55.9 VITAMIN D DEFICIENCY: ICD-10-CM

## 2021-04-22 DIAGNOSIS — R53.83 OTHER FATIGUE: ICD-10-CM

## 2021-04-22 DIAGNOSIS — F41.1 GENERALIZED ANXIETY DISORDER: ICD-10-CM

## 2021-04-22 DIAGNOSIS — Z12.5 SCREENING FOR MALIGNANT NEOPLASM OF PROSTATE: ICD-10-CM

## 2021-04-22 DIAGNOSIS — F51.5 NIGHTMARE DISORDER: ICD-10-CM

## 2021-04-22 DIAGNOSIS — G47.33 OSA (OBSTRUCTIVE SLEEP APNEA): Primary | ICD-10-CM

## 2021-04-22 LAB
ALBUMIN SERPL-MCNC: 4.6 G/DL (ref 3.5–5.2)
ALP BLD-CCNC: 55 U/L (ref 40–129)
ALT SERPL-CCNC: 24 U/L (ref 0–40)
ANION GAP SERPL CALCULATED.3IONS-SCNC: 13 MMOL/L (ref 7–16)
AST SERPL-CCNC: 24 U/L (ref 0–39)
BASOPHILS ABSOLUTE: 0.05 E9/L (ref 0–0.2)
BASOPHILS RELATIVE PERCENT: 0.9 % (ref 0–2)
BILIRUB SERPL-MCNC: 1 MG/DL (ref 0–1.2)
BILIRUBIN URINE: NEGATIVE
BLOOD, URINE: NEGATIVE
BUN BLDV-MCNC: 17 MG/DL (ref 6–20)
CALCIUM SERPL-MCNC: 9.8 MG/DL (ref 8.6–10.2)
CHLORIDE BLD-SCNC: 105 MMOL/L (ref 98–107)
CHOLESTEROL, FASTING: 231 MG/DL (ref 0–199)
CLARITY: CLEAR
CO2: 24 MMOL/L (ref 22–29)
COLOR: YELLOW
CREAT SERPL-MCNC: 1.1 MG/DL (ref 0.7–1.2)
EOSINOPHILS ABSOLUTE: 0.07 E9/L (ref 0.05–0.5)
EOSINOPHILS RELATIVE PERCENT: 1.2 % (ref 0–6)
GFR AFRICAN AMERICAN: >60
GFR NON-AFRICAN AMERICAN: >60 ML/MIN/1.73
GLUCOSE BLD-MCNC: 91 MG/DL (ref 74–99)
GLUCOSE URINE: NEGATIVE MG/DL
HCT VFR BLD CALC: 47.3 % (ref 37–54)
HDLC SERPL-MCNC: 45 MG/DL
HEMOGLOBIN: 15.4 G/DL (ref 12.5–16.5)
IMMATURE GRANULOCYTES #: 0 E9/L
IMMATURE GRANULOCYTES %: 0 % (ref 0–5)
KETONES, URINE: NEGATIVE MG/DL
LDL CHOLESTEROL CALCULATED: 171 MG/DL (ref 0–99)
LEUKOCYTE ESTERASE, URINE: NEGATIVE
LYMPHOCYTES ABSOLUTE: 2.67 E9/L (ref 1.5–4)
LYMPHOCYTES RELATIVE PERCENT: 47.1 % (ref 20–42)
MAGNESIUM: 2 MG/DL (ref 1.6–2.6)
MCH RBC QN AUTO: 30.6 PG (ref 26–35)
MCHC RBC AUTO-ENTMCNC: 32.6 % (ref 32–34.5)
MCV RBC AUTO: 93.8 FL (ref 80–99.9)
MONOCYTES ABSOLUTE: 0.42 E9/L (ref 0.1–0.95)
MONOCYTES RELATIVE PERCENT: 7.4 % (ref 2–12)
NEUTROPHILS ABSOLUTE: 2.46 E9/L (ref 1.8–7.3)
NEUTROPHILS RELATIVE PERCENT: 43.4 % (ref 43–80)
NITRITE, URINE: NEGATIVE
PDW BLD-RTO: 12.2 FL (ref 11.5–15)
PH UA: 6 (ref 5–9)
PLATELET # BLD: 242 E9/L (ref 130–450)
PMV BLD AUTO: 10.2 FL (ref 7–12)
POTASSIUM SERPL-SCNC: 4.7 MMOL/L (ref 3.5–5)
PROSTATE SPECIFIC ANTIGEN: 0.48 NG/ML (ref 0–4)
PROTEIN UA: NEGATIVE MG/DL
RBC # BLD: 5.04 E12/L (ref 3.8–5.8)
SODIUM BLD-SCNC: 142 MMOL/L (ref 132–146)
SPECIFIC GRAVITY UA: 1.02 (ref 1–1.03)
TOTAL PROTEIN: 7.7 G/DL (ref 6.4–8.3)
TRIGLYCERIDE, FASTING: 77 MG/DL (ref 0–149)
TSH SERPL DL<=0.05 MIU/L-ACNC: 2.4 UIU/ML (ref 0.27–4.2)
UROBILINOGEN, URINE: 1 E.U./DL
VITAMIN D 25-HYDROXY: 41 NG/ML (ref 30–100)
VLDLC SERPL CALC-MCNC: 15 MG/DL
WBC # BLD: 5.7 E9/L (ref 4.5–11.5)

## 2021-04-22 PROCEDURE — 99214 OFFICE O/P EST MOD 30 MIN: CPT | Performed by: INTERNAL MEDICINE

## 2021-04-22 RX ORDER — ESCITALOPRAM OXALATE 10 MG/1
10 TABLET ORAL DAILY
Qty: 30 TABLET | Refills: 2 | Status: SHIPPED | OUTPATIENT
Start: 2021-04-22

## 2021-04-22 ASSESSMENT — ENCOUNTER SYMPTOMS
SORE THROAT: 0
NAUSEA: 0
RHINORRHEA: 1
BLOOD IN STOOL: 0
EYE PAIN: 0
ABDOMINAL PAIN: 0
VOMITING: 0
CHEST TIGHTNESS: 0
DIARRHEA: 0
SHORTNESS OF BREATH: 0
COUGH: 1
CONSTIPATION: 0

## 2021-04-22 ASSESSMENT — PATIENT HEALTH QUESTIONNAIRE - PHQ9
SUM OF ALL RESPONSES TO PHQ QUESTIONS 1-9: 8
3. TROUBLE FALLING OR STAYING ASLEEP: 3
2. FEELING DOWN, DEPRESSED OR HOPELESS: 1
7. TROUBLE CONCENTRATING ON THINGS, SUCH AS READING THE NEWSPAPER OR WATCHING TELEVISION: 1
SUM OF ALL RESPONSES TO PHQ9 QUESTIONS 1 & 2: 2

## 2021-04-22 NOTE — PATIENT INSTRUCTIONS
Patient Education        High Cholesterol: Care Instructions  Your Care Instructions     Cholesterol is a type of fat in your blood. It is needed for many body functions, such as making new cells. Cholesterol is made by your body. It also comes from food you eat. High cholesterol means that you have too much of the fat in your blood. This raises your risk of a heart attack and stroke. LDL and HDL are part of your total cholesterol. LDL is the \"bad\" cholesterol. High LDL can raise your risk for heart disease, heart attack, and stroke. HDL is the \"good\" cholesterol. It helps clear bad cholesterol from the body. High HDL is linked with a lower risk of heart disease, heart attack, and stroke. Your cholesterol levels help your doctor find out your risk for having a heart attack or stroke. You and your doctor can talk about whether you need to lower your risk and what treatment is best for you. A heart-healthy lifestyle along with medicines can help lower your cholesterol and your risk. The way you choose to lower your risk will depend on how high your risk is for heart attack and stroke. It will also depend on how you feel about taking medicines. Follow-up care is a key part of your treatment and safety. Be sure to make and go to all appointments, and call your doctor if you are having problems. It's also a good idea to know your test results and keep a list of the medicines you take. How can you care for yourself at home? · Eat a variety of foods every day. Good choices include fruits, vegetables, whole grains (like oatmeal), dried beans and peas, nuts and seeds, soy products (like tofu), and fat-free or low-fat dairy products. · Replace butter, margarine, and hydrogenated or partially hydrogenated oils with olive and canola oils. (Canola oil margarine without trans fat is fine.)  · Replace red meat with fish, poultry, and soy protein (like tofu).   · Limit processed and packaged foods like chips, crackers, and cholesterol and LDL cholesterol (the \"bad\" cholesterol) levels. Your doctor may also speak about HDL (the \"good\" cholesterol) levels. High HDL is linked with a lower risk for heart disease, heart attack, and stroke. Your cholesterol levels help your doctor find out your risk for having a heart attack or stroke. How can you prevent high cholesterol? A heart-healthy lifestyle can help you prevent high cholesterol. This lifestyle helps lower your risk for a heart attack and stroke. · Eat heart-healthy foods. ? Eat fruits, vegetables, whole grains (like oatmeal), dried beans and peas, nuts and seeds, soy products (like tofu), and fat-free or low-fat dairy products. ? Replace butter, margarine, and hydrogenated or partially hydrogenated oils with olive and canola oils. (Canola oil margarine without trans fat is fine.)  ? Replace red meat with fish, poultry, and soy protein (like tofu). ? Limit processed and packaged foods like chips, crackers, and cookies. · Be active. Exercise can improve your cholesterol level. Get at least 30 minutes of exercise on most days of the week. Walking is a good choice. You also may want to do other activities, such as running, swimming, cycling, or playing tennis or team sports. · Stay at a healthy weight. Lose weight if you need to. · Don't smoke. If you need help quitting, talk to your doctor about stop-smoking programs and medicines. These can increase your chances of quitting for good. How is high cholesterol treated? The goal of treatment is to reduce your chances of having a heart attack or stroke. The goal is not to lower your cholesterol numbers only. · You may make lifestyle changes, such as eating healthy foods, not smoking, losing weight, and being more active. · You may have to take medicine. Follow-up care is a key part of your treatment and safety. Be sure to make and go to all appointments, and call your doctor if you are having problems.  It's also a good idea

## 2021-04-22 NOTE — PROGRESS NOTES
[facility]   Internal Medicine     2021  Beaulah Simmonds : 1960 Sex: male  Age:60 y.o. Chief Complaint   Patient presents with    Depression    Discuss Labs        HPI:   Patient presents to office for evaluation of the following medical concerns.    -States had a skin tag removed from eye from dermatology. States also had warts to head, cannot afford medications.     - States having anxiety. Some mood symptoms. States affecting him at work and home. States stress at home. Has seen counseling last year, did not feel was beneficial. Placed on lorazepam as needed. States made too drowsy. PHQ 9 was 8 today. For follow up. Discussed SSRI or SNRI. Asking for work restriction for work due to anxiety.     - States still having issues maintaining sleep. States snores. Uncerrtian if breathing pattern changes. No daytime fatigue, drowsiness. States having nightmares and then difficulty falling back to sleep. Infrequent     - States right hand pain is better. States swelling around 3rd and 4th MCP joints is better. No new swelling. No erythema or rashes. No fever or chills. States taking ibuprofen helps. States activity makes it worse. No radiation, no arm pain, shoulder or neck pain. No numbness or tingling.    - Mild diverticular disease on last colonoscopy. Discussed Diet changes. No abdo pain, No current diarrhea. No melena or Hematochezia. - States hip pain is stable. States improved with exercise. Doing home stretches and exercise. States worsens after slowing down. No numbness, tingling, weakness. No back pain.     - hasThe 10-year ASCVD risk score (Matthew Boss., et al., 2013) is: 8.9%    Health Maintenance   - immunizations:   Influenza Vaccination-  declines  Pneumonia Vaccination  Shingrix Vaccine (Shingles) - (2018) X 2 - [facility] [place]  Tetanus Vaccination  -   covid - declines     - Screenings:   Colonoscopy - 2014 - neg sigmoid diverticuar (repeat 10 yrs)   Hemmocult Cards - (3/2017) - negative  fecal immuno - (3/2017) - negative    Prostate     ROS:  Review of Systems   Constitutional: Negative for appetite change, chills, fever and unexpected weight change. HENT: Positive for congestion and rhinorrhea. Negative for sore throat. Eyes: Negative for pain and visual disturbance. Respiratory: Positive for cough. Negative for chest tightness and shortness of breath. Cardiovascular: Negative for chest pain and palpitations. Gastrointestinal: Negative for abdominal pain, blood in stool, constipation, diarrhea, nausea and vomiting. Genitourinary: Negative for difficulty urinating, dysuria, hematuria, scrotal swelling, testicular pain and urgency. Musculoskeletal: Negative for arthralgias and gait problem. Skin: Negative for rash. Neurological: Negative for dizziness, syncope, weakness, light-headedness and headaches. Hematological: Negative for adenopathy. Does not bruise/bleed easily. Psychiatric/Behavioral: Negative for suicidal ideas. The patient is not nervous/anxious. Current Outpatient Medications:     escitalopram (LEXAPRO) 10 MG tablet, Take 1 tablet by mouth daily, Disp: 30 tablet, Rfl: 2    ciclopirox (PENLAC) 8 % solution, Apply topically nightly., Disp: 6 mL, Rfl: 2    fluticasone (FLONASE) 50 MCG/ACT nasal spray, 2 sprays by Each Nostril route daily, Disp: 1 Bottle, Rfl: 5    ammonium lactate (LAC-HYDRIN) 12 % lotion, Apply topically daily. , Disp: 222 Bottle, Rfl: 1    LORazepam (ATIVAN) 1 MG tablet, daily as needed.  , Disp: , Rfl:     No Known Allergies    Past Medical History:   Diagnosis Date    Skin lesion     perianal       Past Surgical History:   Procedure Laterality Date    COLONOSCOPY      NOSE SURGERY      deviated septum     OTHER SURGICAL HISTORY  02/07/2017    excision perianal skin lesion    PILONIDAL CYST EXCISION      WISDOM TOOTH EXTRACTION         Family History   Problem Relation Age of Onset    High Blood sounds. No murmur. Pulmonary:      Effort: Pulmonary effort is normal.      Breath sounds: Normal breath sounds. No wheezing or rales. Abdominal:      General: Bowel sounds are normal.      Palpations: Abdomen is soft. Tenderness: There is no abdominal tenderness. There is no guarding or rebound. Musculoskeletal: Normal range of motion. Lymphadenopathy:      Cervical: No cervical adenopathy. Skin:     General: Skin is warm and dry. Neurological:      Mental Status: He is alert and oriented to person, place, and time. Cranial Nerves: No cranial nerve deficit. Psychiatric:         Judgment: Judgment normal.         Assessment and Plan:    Diagnoses and all orders for this visit:     DEBORAH (obstructive sleep apnea)  - snoring   - epworth score of 8   - BMI 25.54  - neck circumf. 16 inches   - refer for testing     Generalized anxiety disorder  - no longer following with psychology  - on lorazepma as needed  - discussed SSRI or SNRI   - add lexapro  - work restriction for 40hr work week      Nightmare disorder  - Sleep issues and nightmares disrupting sleep  - following with psychology  - discussed sleep hygiene  - if no improvement - discussed sleep study for sleep apnea  - stable at present     Mixed hyperlipidemia  - watch diet   - The 10-year ASCVD risk score (Margy Posey, et al., 2013) is: 8.9%  - follow labs     Diverticular disease of colon  - watch diet   - stable   - last colonoscopy  Was 2014     Return in about 6 weeks (around 6/3/2021) for check up and review. Orders Placed This Encounter   Procedures    Baseline Diagnostic Sleep Study     Standing Status:   Future     Standing Expiration Date:   4/22/2022     Order Specific Question:   Adult or Pediatric     Answer:   Adult Study (>7 Years)     Order Specific Question:   Location For Sleep Study     Answer:    [facility]     Order Specific Question:   Select Sleep Lab Location     Answer:   [facility]     Requested Prescriptions     Signed Prescriptions Disp Refills    escitalopram (LEXAPRO) 10 MG tablet 30 tablet 2     Sig: Take 1 tablet by mouth daily        Byron Greenwood MD  4/22/2021  4:19 PM

## 2021-04-29 ENCOUNTER — TELEPHONE (OUTPATIENT)
Dept: FAMILY MEDICINE CLINIC | Age: 61
End: 2021-04-29

## 2021-04-29 DIAGNOSIS — G47.33 OSA (OBSTRUCTIVE SLEEP APNEA): Primary | ICD-10-CM

## 2021-04-29 NOTE — TELEPHONE ENCOUNTER
Tayler Pool calling about the sleep testing that you want to have done. He checked with his insurance and found who is in his network for this.        [address] [facility] [address]

## 2021-04-29 NOTE — TELEPHONE ENCOUNTER
Orders Placed This Encounter   Procedures    External Referral To Sleep Medicine     Referral Priority:   Routine     Referral Type:   Eval and Treat     Referral Reason:   Specialty Services Required     Referred to Provider:   Ruma Johnson MD     Requested Specialty:   Sleep Medicine     Number of Visits Requested:   1     Referral placed to [facility] as above

## 2021-04-29 NOTE — TELEPHONE ENCOUNTER
Hi Dr Marybel Cruz    Patient was referred to Dr Henrry Arroyo for Sleep Medicine and the facility [facility] does not accept his insurance    Can patient be sent elsewhere     Thanks

## 2021-04-29 NOTE — TELEPHONE ENCOUNTER
I lefrt a detailed message that the order has been sent to [facility] and for him to call there tomorrow to schedule.

## 2021-05-12 DIAGNOSIS — G47.33 OBSTRUCTIVE SLEEP APNEA (ADULT) (PEDIATRIC): Primary | ICD-10-CM

## 2021-05-13 ENCOUNTER — HOSPITAL ENCOUNTER (OUTPATIENT)
Age: 61
Discharge: HOME | End: 2021-05-15
Payer: COMMERCIAL

## 2021-05-13 DIAGNOSIS — G47.33 OBSTRUCTIVE SLEEP APNEA (ADULT) (PEDIATRIC): ICD-10-CM

## 2021-05-13 PROCEDURE — U0005 INFEC AGEN DETEC AMPLI PROBE: HCPCS

## 2021-05-13 PROCEDURE — U0003 INFECTIOUS AGENT DETECTION BY NUCLEIC ACID (DNA OR RNA); SEVERE ACUTE RESPIRATORY SYNDROME CORONAVIRUS 2 (SARS-COV-2) (CORONAVIRUS DISEASE [COVID-19]), AMPLIFIED PROBE TECHNIQUE, MAKING USE OF HIGH THROUGHPUT TECHNOLOGIES AS DESCRIBED BY CMS-2020-01-R: HCPCS

## 2021-05-15 LAB — SARS-COV-2, PCR: NOT DETECTED

## 2021-05-16 ENCOUNTER — TELEPHONE (OUTPATIENT)
Dept: PRIMARY CARE CLINIC | Age: 61
End: 2021-05-16

## 2021-06-03 ENCOUNTER — OFFICE VISIT (OUTPATIENT)
Dept: FAMILY MEDICINE CLINIC | Age: 61
End: 2021-06-03
Payer: COMMERCIAL

## 2021-06-03 VITALS
BODY MASS INDEX: 25.15 KG/M2 | HEIGHT: 74 IN | TEMPERATURE: 97.7 F | OXYGEN SATURATION: 97 % | WEIGHT: 196 LBS | HEART RATE: 55 BPM | DIASTOLIC BLOOD PRESSURE: 84 MMHG | SYSTOLIC BLOOD PRESSURE: 130 MMHG

## 2021-06-03 DIAGNOSIS — G47.33 OSA (OBSTRUCTIVE SLEEP APNEA): Primary | ICD-10-CM

## 2021-06-03 DIAGNOSIS — E55.9 VITAMIN D DEFICIENCY: ICD-10-CM

## 2021-06-03 DIAGNOSIS — F51.5 NIGHTMARE DISORDER: ICD-10-CM

## 2021-06-03 DIAGNOSIS — R53.83 OTHER FATIGUE: ICD-10-CM

## 2021-06-03 DIAGNOSIS — E78.2 MIXED HYPERLIPIDEMIA: ICD-10-CM

## 2021-06-03 DIAGNOSIS — K57.30 DIVERTICULAR DISEASE OF COLON: ICD-10-CM

## 2021-06-03 DIAGNOSIS — F41.1 GENERALIZED ANXIETY DISORDER: ICD-10-CM

## 2021-06-03 PROCEDURE — 99213 OFFICE O/P EST LOW 20 MIN: CPT | Performed by: INTERNAL MEDICINE

## 2021-06-03 ASSESSMENT — ENCOUNTER SYMPTOMS
BLOOD IN STOOL: 0
CONSTIPATION: 0
RHINORRHEA: 0
VOMITING: 0
COUGH: 0
EYE PAIN: 0
CHEST TIGHTNESS: 0
DIARRHEA: 0
NAUSEA: 0
SORE THROAT: 0
SHORTNESS OF BREATH: 0
ABDOMINAL PAIN: 0

## 2021-06-03 NOTE — PROGRESS NOTES
[facility]) Physicians   Internal Medicine     6/3/2021  Silvia Kraus : 1960 Sex: male  Age:60 y.o. Chief Complaint   Patient presents with    Depression     Anxiety         HPI:   Patient presents to office for evaluation of the following medical concerns.    -States had a skin tag removed from eye from dermatology. States also had warts to head, cannot afford medications.     - States having anxiety. Some mood symptoms. States affecting him at work and home. States stress at home. Has seen counseling last year, did not feel was beneficial. Placed on lorazepam as needed. States made too drowsy. PHQ 9 was 8 (2021). Discussed SSRI or SNRI, added lexapro, was not taking every day. No reported side effects. Wrote for work restriction for work due to anxiety.     - States still having issues maintaining sleep. States snores. Uncerrtian if breathing pattern changes. No daytime fatigue, drowsiness. States having nightmares and then difficulty falling back to sleep. Infrequent. Was referred for sleep study.     - States right hand pain is better. States swelling around 3rd and 4th MCP joints is better. No new swelling. No erythema or rashes. No fever or chills. States taking ibuprofen helps. States activity makes it worse. No radiation, no arm pain, shoulder or neck pain. No numbness or tingling.    - Mild diverticular disease on last colonoscopy. Discussed Diet changes. No abdo pain, No current diarrhea. No melena or Hematochezia. - States hip pain is stable. States improved with exercise. Doing home stretches and exercise. States worsens after slowing down. No numbness, tingling, weakness. No back pain.     - hasThe 10-year ASCVD risk score (Margy Kearns., et al., 2013) is: 8.9%    Health Maintenance   - immunizations:   Influenza Vaccination-  declines  Pneumonia Vaccination  Shingrix Vaccine (Shingles) - (2018) X 2 - [facility] [place]  Tetanus Vaccination  -   covid - declines     - Screenings:   Colonoscopy - 2014 - neg sigmoid diverticuar (repeat 10 yrs)   Hemmocult Cards - (3/2017) - negative  fecal immuno - (3/2017) - negative    Prostate     ROS:  Review of Systems   Constitutional: Negative for appetite change, chills, fever and unexpected weight change. HENT: Negative for congestion, rhinorrhea and sore throat. Eyes: Negative for pain and visual disturbance. Respiratory: Negative for cough, chest tightness and shortness of breath. Cardiovascular: Negative for chest pain and palpitations. Gastrointestinal: Negative for abdominal pain, blood in stool, constipation, diarrhea, nausea and vomiting. Genitourinary: Negative for difficulty urinating, dysuria, hematuria, scrotal swelling, testicular pain and urgency. Musculoskeletal: Negative for arthralgias and gait problem. Skin: Negative for rash. Neurological: Negative for dizziness, syncope, weakness, light-headedness and headaches. Hematological: Negative for adenopathy. Does not bruise/bleed easily. Psychiatric/Behavioral: Negative for suicidal ideas. The patient is not nervous/anxious. Current Outpatient Medications:     escitalopram (LEXAPRO) 10 MG tablet, Take 1 tablet by mouth daily, Disp: 30 tablet, Rfl: 2    ciclopirox (PENLAC) 8 % solution, Apply topically nightly., Disp: 6 mL, Rfl: 2    fluticasone (FLONASE) 50 MCG/ACT nasal spray, 2 sprays by Each Nostril route daily, Disp: 1 Bottle, Rfl: 5    LORazepam (ATIVAN) 1 MG tablet, daily as needed. , Disp: , Rfl:     ammonium lactate (LAC-HYDRIN) 12 % lotion, Apply topically daily. , Disp: 222 Bottle, Rfl: 1    No Known Allergies    Past Medical History:   Diagnosis Date    Skin lesion     perianal       Past Surgical History:   Procedure Laterality Date    COLONOSCOPY      NOSE SURGERY      deviated septum     OTHER SURGICAL HISTORY  02/07/2017    excision perianal skin lesion    PILONIDAL CYST EXCISION      WISDOM TOOTH EXTRACTION Family History   Problem Relation Age of Onset    High Blood Pressure Mother     High Cholesterol Mother     Coronary Art Dis Father     High Blood Pressure Father     High Cholesterol Father     Diabetes Father         Insulin Dependent        Social History     Socioeconomic History    Marital status:      Spouse name: Not on file    Number of children: Not on file    Years of education: Not on file    Highest education level: Not on file   Occupational History    Not on file   Tobacco Use    Smoking status: Never Smoker    Smokeless tobacco: Never Used   Substance and Sexual Activity    Alcohol use: Yes     Comment: occ    Drug use: No    Sexual activity: Not on file   Other Topics Concern    Not on file   Social History Narrative    Not on file     Social Determinants of Health     Financial Resource Strain:     Difficulty of Paying Living Expenses:    Food Insecurity:     Worried About Running Out of Food in the Last Year:     Ran Out of Food in the Last Year:    Transportation Needs:     Lack of Transportation (Medical):  Lack of Transportation (Non-Medical):    Physical Activity:     Days of Exercise per Week:     Minutes of Exercise per Session:    Stress:     Feeling of Stress :    Social Connections:     Frequency of Communication with Friends and Family:     Frequency of Social Gatherings with Friends and Family:     Attends Islam Services:     Active Member of Clubs or Organizations:     Attends Club or Organization Meetings:     Marital Status:    Intimate Partner Violence:     Fear of Current or Ex-Partner:     Emotionally Abused:     Physically Abused:     Sexually Abused:         Vitals:    06/03/21 1530   BP: 130/84   Site: Left Upper Arm   Position: Sitting   Cuff Size: Large Adult   Pulse: 55   Temp: 97.7 °F (36.5 °C)   SpO2: 97%   Weight: 196 lb (88.9 kg)   Height: 6' 1.5\" (1.867 m)       Exam:  Physical Exam  Vitals reviewed. Constitutional:       Appearance: He is well-developed. HENT:      Head: Normocephalic and atraumatic. Right Ear: External ear normal.      Left Ear: External ear normal.   Eyes:      Pupils: Pupils are equal, round, and reactive to light. Neck:      Thyroid: No thyromegaly. Cardiovascular:      Rate and Rhythm: Normal rate and regular rhythm. Heart sounds: Normal heart sounds. No murmur heard. Pulmonary:      Effort: Pulmonary effort is normal.      Breath sounds: Normal breath sounds. No wheezing or rales. Abdominal:      General: Bowel sounds are normal.      Palpations: Abdomen is soft. Tenderness: There is no abdominal tenderness. There is no guarding or rebound. Musculoskeletal:         General: Normal range of motion. Cervical back: Normal range of motion and neck supple. Lymphadenopathy:      Cervical: No cervical adenopathy. Skin:     General: Skin is warm and dry. Neurological:      Mental Status: He is alert and oriented to person, place, and time. Cranial Nerves: No cranial nerve deficit. Psychiatric:         Judgment: Judgment normal.         Assessment and Plan:    Diagnoses and all orders for this visit:     DEBORAH (obstructive sleep apnea)  - snoring   - epworth score of 8   - BMI 25.54  - neck circumf.  16 inches   - refer for testing - has decided to delay testing till end of 2021    Generalized anxiety disorder  - no longer following with psychology  - on lorazepma as needed  - discussed SSRI or SNRI   - added lexapro - was not taking everyday  - work restriction for 40hr work week      Nightmare disorder  - Sleep issues and nightmares disrupting sleep  - following with psychology  - discussed sleep hygiene  - if no improvement - discussed sleep study for sleep apnea  - stable at present     Mixed hyperlipidemia  - watch diet   - The 10-year ASCVD risk score (Dmitri Santos et al., 2013) is: 8.9%  - follow labs     Diverticular disease of colon  - watch diet   - stable   - last colonoscopy  Was 2014     Return in about 5 months (around 11/3/2021) for check up and review.     Orders Placed This Encounter   Procedures    Comprehensive Metabolic Panel     Standing Status:   Future     Standing Expiration Date:   6/3/2022    Magnesium     Standing Status:   Future     Standing Expiration Date:   6/3/2022    Lipid, Fasting     Standing Status:   Future     Standing Expiration Date:   6/3/2022    TSH without Reflex     Standing Status:   Future     Standing Expiration Date:   9/3/2021    Urinalysis     Standing Status:   Future     Standing Expiration Date:   6/3/2022    CBC Auto Differential     Standing Status:   Future     Standing Expiration Date:   6/3/2022     Requested Prescriptions      No prescriptions requested or ordered in this encounter        Dolly Nath MD  6/3/2021  4:03 PM

## 2021-07-14 ENCOUNTER — OFFICE VISIT (OUTPATIENT)
Dept: FAMILY MEDICINE CLINIC | Age: 61
End: 2021-07-14
Payer: COMMERCIAL

## 2021-07-14 ENCOUNTER — TELEPHONE (OUTPATIENT)
Dept: ADMINISTRATIVE | Age: 61
End: 2021-07-14

## 2021-07-14 VITALS
HEART RATE: 60 BPM | RESPIRATION RATE: 18 BRPM | TEMPERATURE: 97.3 F | WEIGHT: 194 LBS | SYSTOLIC BLOOD PRESSURE: 122 MMHG | OXYGEN SATURATION: 100 % | BODY MASS INDEX: 25.71 KG/M2 | HEIGHT: 73 IN | DIASTOLIC BLOOD PRESSURE: 74 MMHG

## 2021-07-14 DIAGNOSIS — R59.0 CERVICAL LYMPHADENOPATHY: Primary | ICD-10-CM

## 2021-07-14 DIAGNOSIS — H92.02 OTALGIA, LEFT: ICD-10-CM

## 2021-07-14 PROCEDURE — 99203 OFFICE O/P NEW LOW 30 MIN: CPT | Performed by: PHYSICIAN ASSISTANT

## 2021-07-14 RX ORDER — PREDNISONE 10 MG/1
TABLET ORAL
Qty: 18 TABLET | Refills: 0 | Status: SHIPPED | OUTPATIENT
Start: 2021-07-14

## 2021-07-14 RX ORDER — AMOXICILLIN AND CLAVULANATE POTASSIUM 875; 125 MG/1; MG/1
1 TABLET, FILM COATED ORAL 2 TIMES DAILY
Qty: 20 TABLET | Refills: 0 | Status: SHIPPED | OUTPATIENT
Start: 2021-07-14 | End: 2021-07-24

## 2021-07-14 NOTE — PROGRESS NOTES
21  Susanne Romo : 1960 Sex: male  Age 63 y.o. Subjective:  Chief Complaint   Patient presents with    Other     pt states left sided jaw pain and tenderness for past week          HPI:   Susanne Romo , 63 y.o. male presents to express care for evaluation of left-sided jaw and ear pain. The patient has had the symptoms for about a week now. The patient denies any fever, chills. Patient states that he has a slight dry cough. The patient denied any chest pain, shortness of breath. The patient states that he has not had much fatigue. The patient denies any traumatic injury. No drainage or discharge of the left ear    HPI  The patient also notes that he has not really had any other myalgias or arthralgias. No generalized fatigue. ROS:   Unless otherwise stated in this report the patient's positive and negative responses for review of systems for constitutional, eyes, ENT, cardiovascular, respiratory, gastrointestinal, neurological, , musculoskeletal, and integument systems and related systems to the presenting problem are either stated in the history of present illness or were not pertinent or were negative for the symptoms and/or complaints related to the presenting medical problem. Positives and pertinent negatives as per HPI. All others reviewed and are negative.       PMH:     Past Medical History:   Diagnosis Date    Skin lesion     perianal       Past Surgical History:   Procedure Laterality Date    COLONOSCOPY      NOSE SURGERY      deviated septum     OTHER SURGICAL HISTORY  2017    excision perianal skin lesion    PILONIDAL CYST EXCISION      WISDOM TOOTH EXTRACTION         Family History   Problem Relation Age of Onset    High Blood Pressure Mother     High Cholesterol Mother     Coronary Art Dis Father     High Blood Pressure Father     High Cholesterol Father     Diabetes Father         Insulin Dependent        Medications:     Current Outpatient Medications:     amoxicillin-clavulanate (AUGMENTIN) 875-125 MG per tablet, Take 1 tablet by mouth 2 times daily for 10 days, Disp: 20 tablet, Rfl: 0    predniSONE (DELTASONE) 10 MG tablet, 3 tabs once daily for 3 days, 2 tabs once daily for 3 days, 1 tab once daily for 3 days, Disp: 18 tablet, Rfl: 0    escitalopram (LEXAPRO) 10 MG tablet, Take 1 tablet by mouth daily, Disp: 30 tablet, Rfl: 2    ciclopirox (PENLAC) 8 % solution, Apply topically nightly., Disp: 6 mL, Rfl: 2    fluticasone (FLONASE) 50 MCG/ACT nasal spray, 2 sprays by Each Nostril route daily, Disp: 1 Bottle, Rfl: 5    LORazepam (ATIVAN) 1 MG tablet, daily as needed. , Disp: , Rfl:     ammonium lactate (LAC-HYDRIN) 12 % lotion, Apply topically daily. , Disp: 222 Bottle, Rfl: 1    Allergies:   No Known Allergies    Social History:     Social History     Tobacco Use    Smoking status: Never Smoker    Smokeless tobacco: Never Used   Substance Use Topics    Alcohol use: Yes     Comment: occ    Drug use: No       Patient lives at home. Physical Exam:     Vitals:    07/14/21 1512   BP: 122/74   Pulse: 60   Resp: 18   Temp: 97.3 °F (36.3 °C)   SpO2: 100%   Weight: 194 lb (88 kg)   Height: 6' 1\" (1.854 m)       Exam:  Physical Exam  Nurse's notes and vital signs reviewed. The patient is not hypoxic. ? General: Alert, no acute distress, patient resting comfortably Patient is not toxic or lethargic. Skin: Warm, intact, no pallor noted. There is no evidence of rash at this time. Head: Normocephalic, atraumatic  Eye: Normal conjunctiva  Ears, Nose, Throat: Right tympanic membrane clear, left tympanic membrane clear. No drainage or discharge noted. No pre- or post-auricular tenderness, erythema, or swelling noted. No rhinorrhea or congestion noted. Posterior oropharynx shows no erythema, tonsillar hypertrophy, or exudate. the uvula is midline. No trismus or drooling is noted. Moist mucous membranes.   Neck: Cervical lymphadenopathy noted to the inferior portion of the left auricular area, there is some tenderness in this area, there is no evidence of erythema, warmth. No erythema, no masses, no fluctuance or induration noted. No meningeal signs. Cardiovascular: Regular Rate and Rhythm  Respiratory: No acute distress, no rhonchi, wheezing or crackles noted. No stridor or retractions are noted. Neurological: A&O x4, normal speech  Psychiatric: Cooperative         Testing:           Medical Decision Making:     Vital signs reviewed    Past medical history reviewed. Allergies reviewed. Medications reviewed. Patient on arrival does not appear to be in any apparent distress or discomfort. The patient has been seen and evaluated. The patient does not appear to be toxic or lethargic. The patient will be treated with Augmentin and prednisone. If the signs or symptoms do not improve we will need to obtain laboratory studies and possibly biopsy of the area. The patient understands the plan. The patient was educated on the proper dosage of motrin and tylenol and the appropriate intervals of each. The patient is to increase fluid intake over the next several days. The patient is to use OTC decongestant as needed. The patient is to return to express care or go directly to the emergency department should any of the signs or symptoms worsen. The patient is to followup with primary care physician in 2-3 days for repeat evaluation. The patient has no other questions or concerns at this time the patient will be discharged home. Clinical Impression:   Migel Joyner was seen today for other. Diagnoses and all orders for this visit:    Cervical lymphadenopathy    Otalgia, left    Other orders  -     amoxicillin-clavulanate (AUGMENTIN) 875-125 MG per tablet;  Take 1 tablet by mouth 2 times daily for 10 days  -     predniSONE (DELTASONE) 10 MG tablet; 3 tabs once daily for 3 days, 2 tabs once daily for 3 days, 1 tab once daily for 3 days        The patient is to call for any concerns or return if any of the signs or symptoms worsen. The patient is to follow-up with PCP in the next 2-3 days for repeat evaluation repeat assessment or go directly to the emergency department.      SIGNATURE: Blanche Grimaldo III, PA-C

## 2021-08-04 ENCOUNTER — PROCEDURE VISIT (OUTPATIENT)
Dept: PODIATRY | Age: 61
End: 2021-08-04
Payer: COMMERCIAL

## 2021-08-04 VITALS
TEMPERATURE: 98.2 F | DIASTOLIC BLOOD PRESSURE: 64 MMHG | WEIGHT: 195 LBS | BODY MASS INDEX: 25.73 KG/M2 | SYSTOLIC BLOOD PRESSURE: 116 MMHG

## 2021-08-04 DIAGNOSIS — M79.674 PAIN IN TOE OF RIGHT FOOT: ICD-10-CM

## 2021-08-04 DIAGNOSIS — L84 CORN OR CALLUS: ICD-10-CM

## 2021-08-04 DIAGNOSIS — B35.1 TINEA UNGUIUM: ICD-10-CM

## 2021-08-04 DIAGNOSIS — M79.675 PAIN IN LEFT TOE(S): Primary | ICD-10-CM

## 2021-08-04 DIAGNOSIS — M89.8X7 EXOSTOSIS OF BONE OF FOOT: ICD-10-CM

## 2021-08-04 PROCEDURE — 99213 OFFICE O/P EST LOW 20 MIN: CPT | Performed by: PODIATRIST

## 2021-08-04 NOTE — PROGRESS NOTES
19  Karla Malone : 1960 Sex: male  Age: 63 y.o. Patient was referred by Cici Hanna MD    Chief Complaint   Patient presents with    Toe Pain     saw pcp Dr. Sherryle Grandchild 6/3/21    Callouses       HPI: pt is seen for follow-up of painful feet patient has multiple areas that are sore including the great toes and plantar aspect heels patient has been using moleskin that helps a lot    ROS:  Const: Denies constitutional symptoms  Musculo: Denies symptoms other than stated above  Skin: Denies symptoms other than stated above       Current Outpatient Medications:     predniSONE (DELTASONE) 10 MG tablet, 3 tabs once daily for 3 days, 2 tabs once daily for 3 days, 1 tab once daily for 3 days, Disp: 18 tablet, Rfl: 0    escitalopram (LEXAPRO) 10 MG tablet, Take 1 tablet by mouth daily, Disp: 30 tablet, Rfl: 2    ciclopirox (PENLAC) 8 % solution, Apply topically nightly., Disp: 6 mL, Rfl: 2    fluticasone (FLONASE) 50 MCG/ACT nasal spray, 2 sprays by Each Nostril route daily, Disp: 1 Bottle, Rfl: 5    LORazepam (ATIVAN) 1 MG tablet, daily as needed. , Disp: , Rfl:     ammonium lactate (LAC-HYDRIN) 12 % lotion, Apply topically daily. , Disp: 222 Bottle, Rfl: 1  No Known Allergies    Past Medical History:   Diagnosis Date    Skin lesion     perianal     Past Surgical History:   Procedure Laterality Date    COLONOSCOPY      NOSE SURGERY      deviated septum     OTHER SURGICAL HISTORY  2017    excision perianal skin lesion    PILONIDAL CYST EXCISION      WISDOM TOOTH EXTRACTION       Family History   Problem Relation Age of Onset    High Blood Pressure Mother     High Cholesterol Mother     Coronary Art Dis Father     High Blood Pressure Father     High Cholesterol Father     Diabetes Father         Insulin Dependent      Social History     Socioeconomic History    Marital status:      Spouse name: Not on file    Number of children: Not on file    Years of education: Not on file    Highest education level: Not on file   Occupational History    Not on file   Tobacco Use    Smoking status: Never Smoker    Smokeless tobacco: Never Used   Substance and Sexual Activity    Alcohol use: Yes     Comment: occ    Drug use: No    Sexual activity: Not on file   Other Topics Concern    Not on file   Social History Narrative    Not on file     Social Determinants of Health     Financial Resource Strain:     Difficulty of Paying Living Expenses:    Food Insecurity:     Worried About Running Out of Food in the Last Year:     [address] in the Last Year:    Transportation Needs:     Lack of Transportation (Medical):  Lack of Transportation (Non-Medical):    Physical Activity:     Days of Exercise per Week:     Minutes of Exercise per Session:    Stress:     Feeling of Stress :    Social Connections:     Frequency of Communication with Friends and Family:     Frequency of Social Gatherings with Friends and Family:     Attends Yarsanism Services:     Active Member of Clubs or Organizations:     Attends Club or Organization Meetings:     Marital Status:    Intimate Partner Violence:     Fear of Current or Ex-Partner:     Emotionally Abused:     Physically Abused:     Sexually Abused:        Vitals:    08/04/21 1519   BP: 116/64   Temp: 98.2 °F (36.8 °C)   TempSrc: Temporal   Weight: 195 lb (88.5 kg)       Focused Lower Extremity Physical Exam:  Vitals:    08/04/21 1519   BP: 116/64   Temp: 98.2 °F (36.8 °C)        Foot Exam    General  General Appearance: appears stated age and healthy   Orientation: alert and oriented to person, place, and time       Right Foot/Ankle     Inspection and Palpation  Tenderness: bony tenderness, great toe interphalangeal joint, great toe metatarsophalangeal joint, lesser metatarsophalangeal joints and metatarsals   Hammertoes: first toe  Hallux valgus: yes  Skin Exam: callus;      Neurovascular  Dorsalis pedis: 3+  Posterior tibial: 3+  Saphenous nerve sensation: normal  Tibial nerve sensation: normal  Superficial peroneal nerve sensation: normal  Deep peroneal nerve sensation: normal  Sural nerve sensation: normal      Left Foot/Ankle      Inspection and Palpation  Tenderness: bony tenderness, great toe interphalangeal joint, great toe metatarsophalangeal joint, lesser metatarsophalangeal joints and metatarsals   Claw toes: first toe  Hallux valgus: yes  Skin Exam: callus;      Neurovascular  Dorsalis pedis: 3+  Posterior tibial: 3+  Saphenous nerve sensation: normal  Tibial nerve sensation: normal  Superficial peroneal nerve sensation: normal  Deep peroneal nerve sensation: normal  Sural nerve sensation: normal           Toenail Description  Sites of Onychomycosis Involvement (Check affected area)  [x] [x] [x] [x] [x] [x] [x] [x] [x] [x]  5 4 3 2 1 1 2 3 4 5                          Right                                        Left    Thickness  [x] [x] [x] [x] [x] [x] [x] [x] [x] [x]  5 4 3 2 1 1 2 3 4 5                         Right                                        Left    Dystrophic Changes   [x] [x] [x] [x] [x] [x] [x] [x] [x] [x]  5 4 3 2 1 1 2 3 4 5                         Right                                        Left    discolored   [x] [x] [x] [x] [x] [x] [x] [x] [x] [x]  5 4 3 2 1 1 2 3 4 5                          Right                                        Left    Incurvation/Ingrowin   [] [] [] [] [] [] [] [] [] []  5 4 3 2 1 1 2 3 4 5                         Right                                        Left    Inflammation/Pain   [x] [x] [x] [x] [x] [x] [x] [x] [x] [x]  5 4 3 2 1 1 2 3 4 5                         Right                                        Left    Ortho Exam    Vascular: pulses  dp  pt    Capillary Refill Time:   Hair growth  Skin:    Edema:    Neurologic:      Musculoskeletal/ Orthopedic examination: Pain with palpation to the first metatarsal phalangeal joint pain with palpation to the interphalangeal joints bilaterally there is a dry callus plantar aspect of bilateral heels and sub-1 bilaterally  NAIL   Web space   Derm:          Assessment and Plan:new rx  penlac qd at night    CALLUS DEBRIDEMENT x2  Verbal informed content was obtained from the patient. The callus lesion on the both feet was debrided with a sterile 15 blade to sub q. It was then debrided,  padded, and an antibiotic ointment was applied to the treated area. Patient tolerated the procedure well with no complications. Montserrat Pope was seen today for toe pain and callouses. Diagnoses and all orders for this visit:    Pain in left toe(s)    Pain in toe of right foot    Corn or callus    Exostosis of bone of foot    Tinea unguium    Today I debrided the calluses of the patient is to continue with moleskin foam padding and an orthotic    No follow-ups on file. Seen By:  Adriana Nunez DPM      Document was created using voice recognition software. Note was reviewed, however may contain grammatical errors.

## 2021-11-05 ENCOUNTER — TELEPHONE (OUTPATIENT)
Dept: FAMILY MEDICINE CLINIC | Age: 61
End: 2021-11-05

## 2021-11-05 ENCOUNTER — NURSE ONLY (OUTPATIENT)
Dept: FAMILY MEDICINE CLINIC | Age: 61
End: 2021-11-05
Payer: COMMERCIAL

## 2021-11-05 DIAGNOSIS — Z23 NEEDS FLU SHOT: ICD-10-CM

## 2021-11-05 PROCEDURE — 90674 CCIIV4 VAC NO PRSV 0.5 ML IM: CPT | Performed by: INTERNAL MEDICINE

## 2021-11-05 PROCEDURE — 90471 IMMUNIZATION ADMIN: CPT | Performed by: INTERNAL MEDICINE

## 2021-11-05 PROCEDURE — 99999 PR OFFICE/OUTPT VISIT,PROCEDURE ONLY: CPT | Performed by: INTERNAL MEDICINE

## 2021-11-05 NOTE — TELEPHONE ENCOUNTER
Appointment was made for patient to get a flu shot. Spoke with patient and he stated that he is just having a runny nose and is feeling fine, per doctor this symptom is okay to give the shot.

## 2021-11-05 NOTE — TELEPHONE ENCOUNTER
----- Message from Rosario Levin sent at 11/5/2021  8:17 AM EDT -----  Subject: Message to Provider    QUESTIONS  Information for Provider? Nasra Kohli would like to schedule a flu shot. Screened red (runny nose)  ---------------------------------------------------------------------------  --------------  CALL BACK INFO  What is the best way for the office to contact you? OK to leave message on   voicemail  Preferred Call Back Phone Number?  [phone]  ---------------------------------------------------------------------------  --------------  SCRIPT ANSWERS  undefined

## 2022-10-17 ENCOUNTER — OFFICE VISIT (OUTPATIENT)
Dept: FAMILY MEDICINE CLINIC | Age: 62
End: 2022-10-17
Payer: COMMERCIAL

## 2022-10-17 ENCOUNTER — TELEPHONE (OUTPATIENT)
Dept: FAMILY MEDICINE CLINIC | Age: 62
End: 2022-10-17

## 2022-10-17 VITALS
SYSTOLIC BLOOD PRESSURE: 142 MMHG | HEIGHT: 73 IN | WEIGHT: 195 LBS | RESPIRATION RATE: 16 BRPM | OXYGEN SATURATION: 98 % | HEART RATE: 78 BPM | DIASTOLIC BLOOD PRESSURE: 82 MMHG | TEMPERATURE: 97.8 F | BODY MASS INDEX: 25.84 KG/M2

## 2022-10-17 DIAGNOSIS — E78.2 MIXED HYPERLIPIDEMIA: Primary | ICD-10-CM

## 2022-10-17 DIAGNOSIS — M79.644 FINGER PAIN, RIGHT: Primary | ICD-10-CM

## 2022-10-17 DIAGNOSIS — E55.9 VITAMIN D DEFICIENCY: ICD-10-CM

## 2022-10-17 DIAGNOSIS — Z12.5 SCREENING FOR MALIGNANT NEOPLASM OF PROSTATE: ICD-10-CM

## 2022-10-17 DIAGNOSIS — M77.01 MEDIAL EPICONDYLITIS OF ELBOW, RIGHT: ICD-10-CM

## 2022-10-17 DIAGNOSIS — R53.83 OTHER FATIGUE: ICD-10-CM

## 2022-10-17 PROCEDURE — 99213 OFFICE O/P EST LOW 20 MIN: CPT | Performed by: STUDENT IN AN ORGANIZED HEALTH CARE EDUCATION/TRAINING PROGRAM

## 2022-10-17 RX ORDER — METHYLPREDNISOLONE 4 MG/1
TABLET ORAL
Qty: 21 KIT | Refills: 0 | Status: SHIPPED | OUTPATIENT
Start: 2022-10-17 | End: 2022-10-23

## 2022-10-17 ASSESSMENT — PATIENT HEALTH QUESTIONNAIRE - PHQ9
SUM OF ALL RESPONSES TO PHQ QUESTIONS 1-9: 0
SUM OF ALL RESPONSES TO PHQ QUESTIONS 1-9: 0
2. FEELING DOWN, DEPRESSED OR HOPELESS: 0
1. LITTLE INTEREST OR PLEASURE IN DOING THINGS: 0
SUM OF ALL RESPONSES TO PHQ QUESTIONS 1-9: 0
SUM OF ALL RESPONSES TO PHQ9 QUESTIONS 1 & 2: 0
SUM OF ALL RESPONSES TO PHQ QUESTIONS 1-9: 0

## 2022-10-17 ASSESSMENT — ENCOUNTER SYMPTOMS
SHORTNESS OF BREATH: 0
ABDOMINAL PAIN: 0
RHINORRHEA: 0

## 2022-10-17 NOTE — TELEPHONE ENCOUNTER
Popeye Price stopped in today for [facility], & was wanting to know if you could put lab work in for him as well. Looks like the last set of orders  in . He is just looking for whatever blood work you would usually do yearly. Please advise.

## 2022-10-17 NOTE — PROGRESS NOTES
Colton Quan (:  1960) is a 63 y.o. male,Established patient, here for evaluation of the following chief complaint(s):  Finger Pain (Middle on r hand) and Elbow Pain (R arm)         ASSESSMENT/PLAN:  1. Finger pain, right  -     XR FINGER RIGHT (MIN 2 VIEWS); Future  2. Medial epicondylitis of elbow, right  -     methylPREDNISolone (MEDROL DOSEPACK) 4 MG tablet; Take by mouth., Disp-21 kit, R-0Normal  Tendonitis/epicondylitis of the R elbow-Exercises + stretches given. Trial of steroids. Rest if possible Follow up with PCP. Will evaluate chronic finger joint swelling with XR. Given chronic nature doubt gout or infection. Discussed return and ER precautions. Patient and or parent verbalized understanding    Return make follow up with PCP. Subjective   SUBJECTIVE/OBJECTIVE:  R middle finger and R elbow pain  Finger-has been there for about a few months-has been using ibuprofen for the pain. It helps somewhat. Gets worse during the course of the week. He has never had any injury. Does not get hot, sometimes get's red. It has been swollen consistently for about a week or two  Elbow pain started when he started a new job  He is right handed  No fever      Review of Systems   Constitutional:  Negative for chills and fever. HENT:  Negative for congestion and rhinorrhea. Respiratory:  Negative for shortness of breath. Cardiovascular:  Negative for chest pain. Gastrointestinal:  Negative for abdominal pain. Musculoskeletal:  Positive for arthralgias and joint swelling. Negative for myalgias. Skin:  Negative for rash and wound. Neurological:  Negative for dizziness and light-headedness. Objective   Physical Exam  Vitals reviewed. Constitutional:       General: He is not in acute distress. HENT:      Head: Normocephalic and atraumatic. Eyes:      Extraocular Movements: Extraocular movements intact.       Conjunctiva/sclera: Conjunctivae normal.   Cardiovascular:      Rate and Rhythm: Normal rate and regular rhythm. Pulmonary:      Effort: Pulmonary effort is normal.      Breath sounds: Normal breath sounds. No wheezing. Abdominal:      General: Abdomen is flat. There is no distension. Musculoskeletal:         General: No deformity. Right elbow: Normal range of motion. Tenderness present in medial epicondyle. Right hand: Swelling present. Comments: Swelling to the PIP of the R 3rd digit. Tenderness. No fluctuance, redness, erythema or warmth. Neurological:      General: No focal deficit present. Mental Status: He is alert and oriented to person, place, and time. An electronic signature was used to authenticate this note.     --Radha Mcclure MD

## 2022-10-17 NOTE — TELEPHONE ENCOUNTER
Orders Placed This Encounter   Procedures    Comprehensive Metabolic Panel     Standing Status:   Future     Standing Expiration Date:   10/17/2023    Magnesium     Standing Status:   Future     Standing Expiration Date:   10/17/2023    Lipid, Fasting     Standing Status:   Future     Standing Expiration Date:   10/17/2023    TSH     Standing Status:   Future     Standing Expiration Date:   10/17/2023    Urinalysis     Standing Status:   Future     Standing Expiration Date:   10/17/2023    PSA Screening     Standing Status:   Future     Standing Expiration Date:   10/17/2023    CBC with Auto Differential     Standing Status:   Future     Standing Expiration Date:   10/17/2023    Vitamin D 25 Hydroxy     Standing Status:   Future     Standing Expiration Date:   10/17/2023     Orders placed

## 2023-03-20 DIAGNOSIS — E55.9 VITAMIN D DEFICIENCY: ICD-10-CM

## 2023-03-20 DIAGNOSIS — Z12.5 SCREENING FOR MALIGNANT NEOPLASM OF PROSTATE: ICD-10-CM

## 2023-03-20 DIAGNOSIS — E78.2 MIXED HYPERLIPIDEMIA: ICD-10-CM

## 2023-03-20 DIAGNOSIS — R53.83 OTHER FATIGUE: ICD-10-CM

## 2023-03-20 LAB
ALBUMIN SERPL-MCNC: 4.5 G/DL (ref 3.5–5.2)
ALP SERPL-CCNC: 52 U/L (ref 40–129)
ALT SERPL-CCNC: 22 U/L (ref 0–40)
ANION GAP SERPL CALCULATED.3IONS-SCNC: 10 MMOL/L (ref 7–16)
AST SERPL-CCNC: 27 U/L (ref 0–39)
BASOPHILS # BLD: 0.05 E9/L (ref 0–0.2)
BASOPHILS NFR BLD: 0.7 % (ref 0–2)
BILIRUB SERPL-MCNC: 1 MG/DL (ref 0–1.2)
BILIRUB UR QL STRIP: NEGATIVE
BUN SERPL-MCNC: 18 MG/DL (ref 6–23)
CALCIUM SERPL-MCNC: 9.1 MG/DL (ref 8.6–10.2)
CHLORIDE SERPL-SCNC: 101 MMOL/L (ref 98–107)
CHOLESTEROL, FASTING: 207 MG/DL (ref 0–199)
CLARITY UR: CLEAR
CO2 SERPL-SCNC: 26 MMOL/L (ref 22–29)
COLOR UR: YELLOW
CREAT SERPL-MCNC: 1 MG/DL (ref 0.7–1.2)
EOSINOPHIL # BLD: 0.06 E9/L (ref 0.05–0.5)
EOSINOPHIL NFR BLD: 0.9 % (ref 0–6)
ERYTHROCYTE [DISTWIDTH] IN BLOOD BY AUTOMATED COUNT: 12.4 FL (ref 11.5–15)
GLUCOSE SERPL-MCNC: 67 MG/DL (ref 74–99)
GLUCOSE UR STRIP-MCNC: NEGATIVE MG/DL
HCT VFR BLD AUTO: 44 % (ref 37–54)
HDLC SERPL-MCNC: 46 MG/DL
HGB BLD-MCNC: 14.3 G/DL (ref 12.5–16.5)
HGB UR QL STRIP: NEGATIVE
IMM GRANULOCYTES # BLD: 0.01 E9/L
IMM GRANULOCYTES NFR BLD: 0.1 % (ref 0–5)
KETONES UR STRIP-MCNC: NEGATIVE MG/DL
LDL CHOLESTEROL CALCULATED: 142 MG/DL (ref 0–99)
LEUKOCYTE ESTERASE UR QL STRIP: NEGATIVE
LYMPHOCYTES # BLD: 2.7 E9/L (ref 1.5–4)
LYMPHOCYTES NFR BLD: 39.7 % (ref 20–42)
MAGNESIUM SERPL-MCNC: 1.8 MG/DL (ref 1.6–2.6)
MCH RBC QN AUTO: 31.2 PG (ref 26–35)
MCHC RBC AUTO-ENTMCNC: 32.5 % (ref 32–34.5)
MCV RBC AUTO: 95.9 FL (ref 80–99.9)
MONOCYTES # BLD: 0.48 E9/L (ref 0.1–0.95)
MONOCYTES NFR BLD: 7.1 % (ref 2–12)
NEUTROPHILS # BLD: 3.5 E9/L (ref 1.8–7.3)
NEUTS SEG NFR BLD: 51.5 % (ref 43–80)
NITRITE UR QL STRIP: NEGATIVE
PH UR STRIP: 6 [PH] (ref 5–9)
PLATELET # BLD AUTO: 202 E9/L (ref 130–450)
PMV BLD AUTO: 10.6 FL (ref 7–12)
POTASSIUM SERPL-SCNC: 4.1 MMOL/L (ref 3.5–5)
PROT SERPL-MCNC: 7.2 G/DL (ref 6.4–8.3)
PROT UR STRIP-MCNC: NEGATIVE MG/DL
PSA SERPL-MCNC: 0.45 NG/ML (ref 0–4)
RBC # BLD AUTO: 4.59 E12/L (ref 3.8–5.8)
SODIUM SERPL-SCNC: 137 MMOL/L (ref 132–146)
SP GR UR STRIP: 1.02 (ref 1–1.03)
TRIGLYCERIDE, FASTING: 97 MG/DL (ref 0–149)
TSH SERPL-MCNC: 2.54 UIU/ML (ref 0.27–4.2)
UROBILINOGEN UR STRIP-ACNC: 1 E.U./DL
VITAMIN D 25-HYDROXY: 38 NG/ML (ref 30–100)
VLDLC SERPL CALC-MCNC: 19 MG/DL
WBC # BLD: 6.8 E9/L (ref 4.5–11.5)

## 2023-03-21 ENCOUNTER — TELEPHONE (OUTPATIENT)
Dept: FAMILY MEDICINE CLINIC | Age: 63
End: 2023-03-21

## 2023-03-21 NOTE — TELEPHONE ENCOUNTER
Please let the patient know that blood work results showed    Cholesterol levels were elevated. Slight improved when compared to previous labs. The 10-year ASCVD risk score (Troy MCCARTY, et al., 2019) is: 11.2%, which is considered borderline increased risk for heart vascular disease in the next 10 years and would need to consider cholesterol medications.   Recommending low-fat Mediterranean type diet and exercise    Sugar level was actually borderline decreased    Electrolytes including magnesium, liver functions, and kidney function values were normal    PSA for prostate was normal    Urine analysis was normal    Thyroid levels were normal    Vitamin D level was normal    Patient has not been seen for regular office visit since June 2021 please reach out to the patient to see about scheduling follow-up appointment    Thanks

## 2023-03-22 NOTE — TELEPHONE ENCOUNTER
[facility]  called back and was advised of the lab results. He then scheduled a follow up with Dr More Phillips in April.

## 2023-04-18 ENCOUNTER — OFFICE VISIT (OUTPATIENT)
Dept: FAMILY MEDICINE CLINIC | Age: 63
End: 2023-04-18
Payer: COMMERCIAL

## 2023-04-18 VITALS
DIASTOLIC BLOOD PRESSURE: 70 MMHG | HEIGHT: 73 IN | RESPIRATION RATE: 18 BRPM | HEART RATE: 58 BPM | SYSTOLIC BLOOD PRESSURE: 126 MMHG | TEMPERATURE: 97.3 F | OXYGEN SATURATION: 95 % | BODY MASS INDEX: 26.51 KG/M2 | WEIGHT: 200 LBS

## 2023-04-18 DIAGNOSIS — M79.644 FINGER PAIN, RIGHT: Primary | ICD-10-CM

## 2023-04-18 DIAGNOSIS — F51.5 NIGHTMARE DISORDER: ICD-10-CM

## 2023-04-18 DIAGNOSIS — E78.2 MIXED HYPERLIPIDEMIA: ICD-10-CM

## 2023-04-18 DIAGNOSIS — Z12.11 SCREENING FOR MALIGNANT NEOPLASM OF COLON: ICD-10-CM

## 2023-04-18 DIAGNOSIS — K57.30 DIVERTICULAR DISEASE OF COLON: ICD-10-CM

## 2023-04-18 DIAGNOSIS — G47.33 OSA (OBSTRUCTIVE SLEEP APNEA): ICD-10-CM

## 2023-04-18 DIAGNOSIS — R42 EPISODIC LIGHTHEADEDNESS: ICD-10-CM

## 2023-04-18 PROCEDURE — 99213 OFFICE O/P EST LOW 20 MIN: CPT | Performed by: INTERNAL MEDICINE

## 2023-04-18 RX ORDER — VITAMIN B COMPLEX
1000 TABLET ORAL DAILY
COMMUNITY

## 2023-04-18 RX ORDER — AMMONIUM LACTATE 12 G/100G
LOTION TOPICAL
Qty: 222 EACH | Refills: 1 | Status: SHIPPED | OUTPATIENT
Start: 2023-04-18

## 2023-04-18 SDOH — ECONOMIC STABILITY: FOOD INSECURITY: WITHIN THE PAST 12 MONTHS, THE FOOD YOU BOUGHT JUST DIDN'T LAST AND YOU DIDN'T HAVE MONEY TO GET MORE.: PATIENT DECLINED

## 2023-04-18 SDOH — ECONOMIC STABILITY: INCOME INSECURITY: HOW HARD IS IT FOR YOU TO PAY FOR THE VERY BASICS LIKE FOOD, HOUSING, MEDICAL CARE, AND HEATING?: PATIENT DECLINED

## 2023-04-18 SDOH — ECONOMIC STABILITY: FOOD INSECURITY: WITHIN THE PAST 12 MONTHS, YOU WORRIED THAT YOUR FOOD WOULD RUN OUT BEFORE YOU GOT MONEY TO BUY MORE.: PATIENT DECLINED

## 2023-04-18 SDOH — ECONOMIC STABILITY: HOUSING INSECURITY
IN THE LAST 12 MONTHS, WAS THERE A TIME WHEN YOU DID NOT HAVE A STEADY PLACE TO SLEEP OR SLEPT IN A SHELTER (INCLUDING NOW)?: PATIENT REFUSED

## 2023-04-18 ASSESSMENT — PATIENT HEALTH QUESTIONNAIRE - PHQ9
SUM OF ALL RESPONSES TO PHQ9 QUESTIONS 1 & 2: 0
SUM OF ALL RESPONSES TO PHQ QUESTIONS 1-9: 0
1. LITTLE INTEREST OR PLEASURE IN DOING THINGS: 0
SUM OF ALL RESPONSES TO PHQ QUESTIONS 1-9: 0
2. FEELING DOWN, DEPRESSED OR HOPELESS: 0

## 2023-04-18 ASSESSMENT — ENCOUNTER SYMPTOMS
COUGH: 0
NAUSEA: 0
CHEST TIGHTNESS: 0
DIARRHEA: 0
VOMITING: 0
EYE PAIN: 0
SHORTNESS OF BREATH: 0
RHINORRHEA: 0
SORE THROAT: 0
BLOOD IN STOOL: 0
CONSTIPATION: 0
ABDOMINAL PAIN: 0

## 2023-04-18 NOTE — PROGRESS NOTES
[facility]) Physicians   Internal Medicine     2023  Claudell Borrow : 1960 Sex: male  Age:62 y.o. Chief Complaint   Patient presents with    Follow-up     Routine 6 month f/u        HPI:   Patient presents to office for evaluation of the following medical concerns. - States has having lightheaded. Sates felt as if going to pass out but never did, then States would also have nausea. States had symptoms for 3-4 days then stopped. - States having anxiety. Some mood symptoms. States stress at home. Has seen counseling did not feel was beneficial. Stopped lorazepam as needed. States made too drowsy. Discussed SSRI or SNRI, added lexapro, was not taking every day, stopped. States has started a new job and improved. PHQ score of 0 (2023)     - States still having issues maintaining sleep. States snores. Uncerrtian if breathing pattern changes. No daytime fatigue, drowsiness. States having nightmares and then difficulty falling back to sleep. Infrequent. Was referred for sleep study.     - States right hand pain is better. States swelling around 3rd and 4th MCP joints is better. No new swelling. No erythema or rashes. No fever or chills. States taking ibuprofen helps. States activity makes it worse. No radiation, no arm pain, shoulder or neck pain. No numbness or tingling.    - Mild diverticular disease on last colonoscopy. Discussed Diet changes. No abdo pain, No current diarrhea. No melena or Hematochezia. - States hip pain is stable. States improved with exercise. Doing home stretches and exercise. States worsens after slowing down. No numbness, tingling, weakness. No back pain. - Has elevated cholesterol. The 10-year ASCVD risk score (Troy DK, et al., 2019) is: 9.2% (2023)     -States had a skin tag removed from eye from dermatology. States also had warts to head, cannot afford medications.      - labs from (3/20/2023) reviewed with patient 2023    Health Maintenance   -

## 2023-04-21 ENCOUNTER — TELEPHONE (OUTPATIENT)
Dept: ADMINISTRATIVE | Age: 63
End: 2023-04-21

## 2023-04-24 PROBLEM — J10.1 INFLUENZA DUE TO INFLUENZA A VIRUS: Status: RESOLVED | Noted: 2020-03-11 | Resolved: 2023-04-24

## 2023-04-24 PROBLEM — F51.5 NIGHTMARE DISORDER: Status: RESOLVED | Noted: 2020-03-11 | Resolved: 2023-04-24

## 2023-04-24 PROBLEM — M79.674 PAIN IN TOE OF RIGHT FOOT: Status: RESOLVED | Noted: 2019-05-21 | Resolved: 2023-04-24

## 2023-04-24 PROBLEM — M79.675 PAIN IN LEFT TOE(S): Status: RESOLVED | Noted: 2019-05-21 | Resolved: 2023-04-24

## 2023-04-24 PROBLEM — L84 CORN OR CALLUS: Status: RESOLVED | Noted: 2019-05-21 | Resolved: 2023-04-24

## 2023-04-25 ENCOUNTER — OFFICE VISIT (OUTPATIENT)
Dept: CARDIOLOGY CLINIC | Age: 63
End: 2023-04-25
Payer: COMMERCIAL

## 2023-04-25 VITALS
DIASTOLIC BLOOD PRESSURE: 80 MMHG | WEIGHT: 204.6 LBS | BODY MASS INDEX: 26.26 KG/M2 | SYSTOLIC BLOOD PRESSURE: 152 MMHG | HEART RATE: 58 BPM | RESPIRATION RATE: 16 BRPM | HEIGHT: 74 IN

## 2023-04-25 DIAGNOSIS — I51.9 HEART PROBLEM: Primary | ICD-10-CM

## 2023-04-25 PROBLEM — R55 SYNCOPE AND COLLAPSE: Status: RESOLVED | Noted: 2020-03-11 | Resolved: 2023-04-25

## 2023-04-25 PROCEDURE — 99203 OFFICE O/P NEW LOW 30 MIN: CPT | Performed by: INTERNAL MEDICINE

## 2023-04-25 PROCEDURE — 93000 ELECTROCARDIOGRAM COMPLETE: CPT | Performed by: INTERNAL MEDICINE

## 2023-04-25 NOTE — PROGRESS NOTES
Chief Complaint   Patient presents with    Heart Problem       Patient Active Problem List    Diagnosis Date Noted    Generalized anxiety disorder 03/11/2020    Exostosis of bone of foot 05/21/2019    Hyperlipidemia 07/28/2014    Diverticular disease of colon 07/28/2014       Current Outpatient Medications   Medication Sig Dispense Refill    Vitamin D (CHOLECALCIFEROL) 25 MCG (1000 UT) TABS tablet Take 1 tablet by mouth daily      ammonium lactate (LAC-HYDRIN) 12 % lotion Apply topically daily. 222 each 1     No current facility-administered medications for this visit. No Known Allergies    Vitals:    04/25/23 0805   BP: (!) 152/80   Pulse: 58   Resp: 16   Weight: 204 lb 9.6 oz (92.8 kg)   Height: 6' 2\" (1.88 m)                 SUBJECTIVE: Syed Gonzalez presents to the office today for consult - dr Nataliya Garcia. He complains of  a period of 3-4 days several weeks back, of episodic lightheadedness and nausea, both at work and at rest. No associated chest pain or palpitations,  Now resolved,  and denies   dyspnea, exertional chest pressure/discomfort, fatigue, irregular heart beat, lower extremity edema, near-syncope, orthopnea, palpitations, paroxysmal nocturnal dyspnea, and syncope. Works job requiring physical labor, drinks 4 cups of coffee a day. No alcohol. Does home chores and also exercises as well         Physical Exam   BP (!) 152/80   Pulse 58   Resp 16   Ht 6' 2\" (1.88 m)   Wt 204 lb 9.6 oz (92.8 kg)   BMI 26.27 kg/m²   Constitutional: Oriented to person, place, and time. Well-developed and well-nourished. No distress. Head: Normocephalic and atraumatic. Neck: No JVD present. Carotid bruit is not present. Cardiovascular: Normal rate, regular rhythm, normal heart sounds and intact distal pulses. No gallop and no friction rub. No murmur heard. Pulmonary/Chest: Effort normal and breath sounds normal.   Abdominal: Soft. Bowel sounds are normal. No distension and no mass.

## 2023-05-01 ENCOUNTER — TELEPHONE (OUTPATIENT)
Dept: FAMILY MEDICINE CLINIC | Age: 63
End: 2023-05-01

## 2023-05-01 DIAGNOSIS — Z12.11 SCREENING FOR MALIGNANT NEOPLASM OF COLON: ICD-10-CM

## 2023-05-01 LAB
CONTROL: NORMAL
HEMOCCULT STL QL: NORMAL

## 2023-05-01 NOTE — TELEPHONE ENCOUNTER
If he has never had a hepatitis B vaccine yes we do recommend that all patients have a hepatitis B vaccine, hepatitis A is okay as well

## 2023-05-01 NOTE — TELEPHONE ENCOUNTER
Patient went to the pharmacy to get his tetanus shot as recommended by you and the pharmacist told him he should also get the Hep A/B combo shot. Patient wants to know if you think he should?

## 2023-05-01 NOTE — TELEPHONE ENCOUNTER
Please let the patient know that stool test for blood and colon cancer screening was considered negative.      Recommending repeat testing in 1 year or colonoscopy or sooner evaluation if any new changes or symptoms or concerns    Thanks

## 2023-07-12 ENCOUNTER — APPOINTMENT (OUTPATIENT)
Dept: GENERAL RADIOLOGY | Age: 63
DRG: 312 | End: 2023-07-12

## 2023-07-12 ENCOUNTER — APPOINTMENT (OUTPATIENT)
Dept: CT IMAGING | Age: 63
DRG: 312 | End: 2023-07-12

## 2023-07-12 ENCOUNTER — HOSPITAL ENCOUNTER (INPATIENT)
Age: 63
LOS: 1 days | Discharge: HOME | DRG: 312 | End: 2023-07-13
Attending: EMERGENCY MEDICINE | Admitting: STUDENT IN AN ORGANIZED HEALTH CARE EDUCATION/TRAINING PROGRAM
Payer: COMMERCIAL

## 2023-07-12 DIAGNOSIS — R55 SYNCOPE AND COLLAPSE: Primary | ICD-10-CM

## 2023-07-12 LAB
ALBUMIN SERPL-MCNC: 4.6 G/DL (ref 3.5–5.2)
ALP SERPL-CCNC: 56 U/L (ref 40–129)
ALT SERPL-CCNC: 30 U/L (ref 0–40)
ANION GAP SERPL CALCULATED.3IONS-SCNC: 7 MMOL/L (ref 7–16)
AST SERPL-CCNC: 29 U/L (ref 0–39)
BASOPHILS # BLD: 0.02 E9/L (ref 0–0.2)
BASOPHILS NFR BLD: 0.2 % (ref 0–2)
BILIRUB SERPL-MCNC: 0.5 MG/DL (ref 0–1.2)
BNP BLD-MCNC: 87 PG/ML (ref 0–125)
BUN SERPL-MCNC: 17 MG/DL (ref 6–23)
CALCIUM SERPL-MCNC: 9 MG/DL (ref 8.6–10.2)
CHLORIDE SERPL-SCNC: 100 MMOL/L (ref 98–107)
CO2 SERPL-SCNC: 27 MMOL/L (ref 22–29)
CREAT SERPL-MCNC: 1 MG/DL (ref 0.7–1.2)
EOSINOPHIL # BLD: 0.01 E9/L (ref 0.05–0.5)
EOSINOPHIL NFR BLD: 0.1 % (ref 0–6)
ERYTHROCYTE [DISTWIDTH] IN BLOOD BY AUTOMATED COUNT: 11.9 FL (ref 11.5–15)
GLUCOSE SERPL-MCNC: 156 MG/DL (ref 74–99)
HCT VFR BLD AUTO: 40.7 % (ref 37–54)
HGB BLD-MCNC: 13.8 G/DL (ref 12.5–16.5)
IMM GRANULOCYTES # BLD: 0.05 E9/L
IMM GRANULOCYTES NFR BLD: 0.5 % (ref 0–5)
LACTATE BLDV-SCNC: 1.6 MMOL/L (ref 0.5–1.9)
LYMPHOCYTES # BLD: 0.87 E9/L (ref 1.5–4)
LYMPHOCYTES NFR BLD: 9.2 % (ref 20–42)
MCH RBC QN AUTO: 31.2 PG (ref 26–35)
MCHC RBC AUTO-ENTMCNC: 33.9 % (ref 32–34.5)
MCV RBC AUTO: 92.1 FL (ref 80–99.9)
METER GLUCOSE: 143 MG/DL (ref 74–99)
MONOCYTES # BLD: 0.28 E9/L (ref 0.1–0.95)
MONOCYTES NFR BLD: 2.9 % (ref 2–12)
NEUTROPHILS # BLD: 8.27 E9/L (ref 1.8–7.3)
NEUTS SEG NFR BLD: 87.1 % (ref 43–80)
PLATELET # BLD AUTO: 206 E9/L (ref 130–450)
PMV BLD AUTO: 9.4 FL (ref 7–12)
POTASSIUM SERPL-SCNC: 5.1 MMOL/L (ref 3.5–5)
PROT SERPL-MCNC: 7.4 G/DL (ref 6.4–8.3)
RBC # BLD AUTO: 4.42 E12/L (ref 3.8–5.8)
SODIUM SERPL-SCNC: 134 MMOL/L (ref 132–146)
TROPONIN, HIGH SENSITIVITY: 6 NG/L (ref 0–11)
WBC # BLD: 9.5 E9/L (ref 4.5–11.5)

## 2023-07-12 PROCEDURE — 83880 ASSAY OF NATRIURETIC PEPTIDE: CPT

## 2023-07-12 PROCEDURE — 96360 HYDRATION IV INFUSION INIT: CPT

## 2023-07-12 PROCEDURE — 93005 ELECTROCARDIOGRAM TRACING: CPT | Performed by: EMERGENCY MEDICINE

## 2023-07-12 PROCEDURE — 83605 ASSAY OF LACTIC ACID: CPT

## 2023-07-12 PROCEDURE — 71275 CT ANGIOGRAPHY CHEST: CPT

## 2023-07-12 PROCEDURE — 70450 CT HEAD/BRAIN W/O DYE: CPT

## 2023-07-12 PROCEDURE — 36415 COLL VENOUS BLD VENIPUNCTURE: CPT

## 2023-07-12 PROCEDURE — 71045 X-RAY EXAM CHEST 1 VIEW: CPT

## 2023-07-12 PROCEDURE — 6360000004 HC RX CONTRAST MEDICATION: Performed by: RADIOLOGY

## 2023-07-12 PROCEDURE — 96361 HYDRATE IV INFUSION ADD-ON: CPT

## 2023-07-12 PROCEDURE — 85025 COMPLETE CBC W/AUTO DIFF WBC: CPT

## 2023-07-12 PROCEDURE — 2580000003 HC RX 258: Performed by: EMERGENCY MEDICINE

## 2023-07-12 PROCEDURE — 99285 EMERGENCY DEPT VISIT HI MDM: CPT

## 2023-07-12 PROCEDURE — 84484 ASSAY OF TROPONIN QUANT: CPT

## 2023-07-12 PROCEDURE — 80053 COMPREHEN METABOLIC PANEL: CPT

## 2023-07-12 PROCEDURE — 82962 GLUCOSE BLOOD TEST: CPT

## 2023-07-12 RX ORDER — 0.9 % SODIUM CHLORIDE 0.9 %
1000 INTRAVENOUS SOLUTION INTRAVENOUS ONCE
Status: COMPLETED | OUTPATIENT
Start: 2023-07-12 | End: 2023-07-12

## 2023-07-12 RX ADMIN — IOPAMIDOL 75 ML: 755 INJECTION, SOLUTION INTRAVENOUS at 22:12

## 2023-07-12 RX ADMIN — SODIUM CHLORIDE 1000 ML: 9 INJECTION, SOLUTION INTRAVENOUS at 20:57

## 2023-07-12 ASSESSMENT — PAIN - FUNCTIONAL ASSESSMENT: PAIN_FUNCTIONAL_ASSESSMENT: NONE - DENIES PAIN

## 2023-07-13 ENCOUNTER — HOSPITAL ENCOUNTER (INPATIENT)
Age: 63
LOS: 1 days | Discharge: HOME | End: 2023-07-14
Attending: INTERNAL MEDICINE | Admitting: INTERNAL MEDICINE
Payer: COMMERCIAL

## 2023-07-13 ENCOUNTER — APPOINTMENT (OUTPATIENT)
Dept: MRI IMAGING | Age: 63
DRG: 312 | End: 2023-07-13

## 2023-07-13 ENCOUNTER — APPOINTMENT (OUTPATIENT)
Dept: NEUROLOGY | Age: 63
DRG: 312 | End: 2023-07-13

## 2023-07-13 VITALS
TEMPERATURE: 98.1 F | HEART RATE: 74 BPM | BODY MASS INDEX: 28.96 KG/M2 | RESPIRATION RATE: 16 BRPM | DIASTOLIC BLOOD PRESSURE: 69 MMHG | WEIGHT: 213.85 LBS | SYSTOLIC BLOOD PRESSURE: 134 MMHG | OXYGEN SATURATION: 95 % | HEIGHT: 72 IN

## 2023-07-13 DIAGNOSIS — R56.9 NEW ONSET SEIZURE (HCC): Primary | ICD-10-CM

## 2023-07-13 PROBLEM — R73.9 HYPERGLYCEMIA: Status: ACTIVE | Noted: 2023-07-13

## 2023-07-13 PROBLEM — R55 SYNCOPE AND COLLAPSE: Status: ACTIVE | Noted: 2023-07-13

## 2023-07-13 PROBLEM — L23.7 POISON OAK: Status: ACTIVE | Noted: 2023-07-13

## 2023-07-13 LAB
ALBUMIN SERPL-MCNC: 4.2 G/DL (ref 3.5–5.2)
ALP SERPL-CCNC: 50 U/L (ref 40–129)
ALT SERPL-CCNC: 27 U/L (ref 0–40)
ANION GAP SERPL CALCULATED.3IONS-SCNC: 11 MMOL/L (ref 7–16)
AST SERPL-CCNC: 40 U/L (ref 0–39)
BILIRUB SERPL-MCNC: 0.7 MG/DL (ref 0–1.2)
BUN SERPL-MCNC: 13 MG/DL (ref 6–23)
CALCIUM SERPL-MCNC: 8.6 MG/DL (ref 8.6–10.2)
CHLORIDE SERPL-SCNC: 100 MMOL/L (ref 98–107)
CO2 SERPL-SCNC: 22 MMOL/L (ref 22–29)
CREAT SERPL-MCNC: 0.8 MG/DL (ref 0.7–1.2)
EKG ATRIAL RATE: 72 BPM
EKG P AXIS: 52 DEGREES
EKG P-R INTERVAL: 166 MS
EKG Q-T INTERVAL: 370 MS
EKG QRS DURATION: 96 MS
EKG QTC CALCULATION (BAZETT): 405 MS
EKG R AXIS: -13 DEGREES
EKG T AXIS: 43 DEGREES
EKG VENTRICULAR RATE: 72 BPM
GLUCOSE SERPL-MCNC: 114 MG/DL (ref 74–99)
HBA1C MFR BLD: 5.6 % (ref 4–5.6)
LV EF: 65 %
LVEF MODALITY: NORMAL
POTASSIUM SERPL-SCNC: 4.2 MMOL/L (ref 3.5–5)
PROT SERPL-MCNC: 6.7 G/DL (ref 6.4–8.3)
REASON FOR REJECTION: NORMAL
REJECTED TEST: NORMAL
SODIUM SERPL-SCNC: 133 MMOL/L (ref 132–146)
TROPONIN, HIGH SENSITIVITY: <6 NG/L (ref 0–11)

## 2023-07-13 PROCEDURE — 2580000003 HC RX 258: Performed by: NURSE PRACTITIONER

## 2023-07-13 PROCEDURE — 36415 COLL VENOUS BLD VENIPUNCTURE: CPT

## 2023-07-13 PROCEDURE — 2060000000 HC ICU INTERMEDIATE R&B

## 2023-07-13 PROCEDURE — 93306 TTE W/DOPPLER COMPLETE: CPT

## 2023-07-13 PROCEDURE — 6360000002 HC RX W HCPCS: Performed by: INTERNAL MEDICINE

## 2023-07-13 PROCEDURE — A9579 GAD-BASE MR CONTRAST NOS,1ML: HCPCS | Performed by: RADIOLOGY

## 2023-07-13 PROCEDURE — 6360000004 HC RX CONTRAST MEDICATION: Performed by: RADIOLOGY

## 2023-07-13 PROCEDURE — 1200000000 HC SEMI PRIVATE

## 2023-07-13 PROCEDURE — 99239 HOSP IP/OBS DSCHRG MGMT >30: CPT | Performed by: INTERNAL MEDICINE

## 2023-07-13 PROCEDURE — 70553 MRI BRAIN STEM W/O & W/DYE: CPT

## 2023-07-13 PROCEDURE — 93010 ELECTROCARDIOGRAM REPORT: CPT | Performed by: INTERNAL MEDICINE

## 2023-07-13 PROCEDURE — 80053 COMPREHEN METABOLIC PANEL: CPT

## 2023-07-13 PROCEDURE — 95816 EEG AWAKE AND DROWSY: CPT

## 2023-07-13 PROCEDURE — 95816 EEG AWAKE AND DROWSY: CPT | Performed by: PSYCHIATRY & NEUROLOGY

## 2023-07-13 PROCEDURE — 6360000002 HC RX W HCPCS

## 2023-07-13 PROCEDURE — 2580000003 HC RX 258

## 2023-07-13 PROCEDURE — APPSS45 APP SPLIT SHARED TIME 31-45 MINUTES

## 2023-07-13 PROCEDURE — 6370000000 HC RX 637 (ALT 250 FOR IP)

## 2023-07-13 PROCEDURE — 99222 1ST HOSP IP/OBS MODERATE 55: CPT | Performed by: STUDENT IN AN ORGANIZED HEALTH CARE EDUCATION/TRAINING PROGRAM

## 2023-07-13 PROCEDURE — 2700000000 HC OXYGEN THERAPY PER DAY

## 2023-07-13 PROCEDURE — 6370000000 HC RX 637 (ALT 250 FOR IP): Performed by: INTERNAL MEDICINE

## 2023-07-13 PROCEDURE — 83036 HEMOGLOBIN GLYCOSYLATED A1C: CPT

## 2023-07-13 RX ORDER — ONDANSETRON 2 MG/ML
4 INJECTION INTRAMUSCULAR; INTRAVENOUS EVERY 6 HOURS PRN
Status: DISCONTINUED | OUTPATIENT
Start: 2023-07-13 | End: 2023-07-14 | Stop reason: HOSPADM

## 2023-07-13 RX ORDER — ACETAMINOPHEN 325 MG/1
650 TABLET ORAL EVERY 6 HOURS PRN
Status: DISCONTINUED | OUTPATIENT
Start: 2023-07-13 | End: 2023-07-14 | Stop reason: HOSPADM

## 2023-07-13 RX ORDER — SODIUM CHLORIDE 9 MG/ML
INJECTION, SOLUTION INTRAVENOUS PRN
Status: DISCONTINUED | OUTPATIENT
Start: 2023-07-13 | End: 2023-07-13 | Stop reason: HOSPADM

## 2023-07-13 RX ORDER — LORAZEPAM 2 MG/ML
2 INJECTION INTRAMUSCULAR EVERY 6 HOURS PRN
Status: DISCONTINUED | OUTPATIENT
Start: 2023-07-13 | End: 2023-07-13 | Stop reason: HOSPADM

## 2023-07-13 RX ORDER — SODIUM CHLORIDE 0.9 % (FLUSH) 0.9 %
10 SYRINGE (ML) INJECTION PRN
Status: DISCONTINUED | OUTPATIENT
Start: 2023-07-13 | End: 2023-07-14 | Stop reason: HOSPADM

## 2023-07-13 RX ORDER — POLYETHYLENE GLYCOL 3350 17 G/17G
17 POWDER, FOR SOLUTION ORAL DAILY PRN
Status: DISCONTINUED | OUTPATIENT
Start: 2023-07-13 | End: 2023-07-13 | Stop reason: HOSPADM

## 2023-07-13 RX ORDER — LEVETIRACETAM 500 MG/1
500 TABLET ORAL 2 TIMES DAILY
Status: DISCONTINUED | OUTPATIENT
Start: 2023-07-13 | End: 2023-07-14 | Stop reason: HOSPADM

## 2023-07-13 RX ORDER — TRIAMCINOLONE ACETONIDE 5 MG/G
OINTMENT TOPICAL 2 TIMES DAILY
Status: DISCONTINUED | OUTPATIENT
Start: 2023-07-13 | End: 2023-07-13 | Stop reason: HOSPADM

## 2023-07-13 RX ORDER — SODIUM CHLORIDE 0.9 % (FLUSH) 0.9 %
5-40 SYRINGE (ML) INJECTION EVERY 12 HOURS SCHEDULED
Status: DISCONTINUED | OUTPATIENT
Start: 2023-07-13 | End: 2023-07-13 | Stop reason: HOSPADM

## 2023-07-13 RX ORDER — SODIUM CHLORIDE 0.9 % (FLUSH) 0.9 %
5-40 SYRINGE (ML) INJECTION EVERY 12 HOURS SCHEDULED
Status: DISCONTINUED | OUTPATIENT
Start: 2023-07-13 | End: 2023-07-14 | Stop reason: HOSPADM

## 2023-07-13 RX ORDER — ENOXAPARIN SODIUM 100 MG/ML
40 INJECTION SUBCUTANEOUS DAILY
Status: DISCONTINUED | OUTPATIENT
Start: 2023-07-13 | End: 2023-07-13 | Stop reason: HOSPADM

## 2023-07-13 RX ORDER — SODIUM CHLORIDE 9 MG/ML
INJECTION, SOLUTION INTRAVENOUS CONTINUOUS
Status: DISCONTINUED | OUTPATIENT
Start: 2023-07-13 | End: 2023-07-13 | Stop reason: HOSPADM

## 2023-07-13 RX ORDER — ACETAMINOPHEN 650 MG/1
650 SUPPOSITORY RECTAL EVERY 6 HOURS PRN
Status: DISCONTINUED | OUTPATIENT
Start: 2023-07-13 | End: 2023-07-13 | Stop reason: HOSPADM

## 2023-07-13 RX ORDER — ONDANSETRON 4 MG/1
4 TABLET, ORALLY DISINTEGRATING ORAL EVERY 8 HOURS PRN
Status: DISCONTINUED | OUTPATIENT
Start: 2023-07-13 | End: 2023-07-14 | Stop reason: HOSPADM

## 2023-07-13 RX ORDER — ONDANSETRON 2 MG/ML
4 INJECTION INTRAMUSCULAR; INTRAVENOUS EVERY 6 HOURS PRN
Status: DISCONTINUED | OUTPATIENT
Start: 2023-07-13 | End: 2023-07-13 | Stop reason: HOSPADM

## 2023-07-13 RX ORDER — SODIUM CHLORIDE 0.9 % (FLUSH) 0.9 %
5-40 SYRINGE (ML) INJECTION PRN
Status: DISCONTINUED | OUTPATIENT
Start: 2023-07-13 | End: 2023-07-13 | Stop reason: HOSPADM

## 2023-07-13 RX ORDER — LEVETIRACETAM 500 MG/1
500 TABLET ORAL 2 TIMES DAILY
Status: DISCONTINUED | OUTPATIENT
Start: 2023-07-13 | End: 2023-07-13 | Stop reason: HOSPADM

## 2023-07-13 RX ORDER — ACETAMINOPHEN 650 MG/1
650 SUPPOSITORY RECTAL EVERY 6 HOURS PRN
Status: DISCONTINUED | OUTPATIENT
Start: 2023-07-13 | End: 2023-07-14 | Stop reason: HOSPADM

## 2023-07-13 RX ORDER — ONDANSETRON 4 MG/1
4 TABLET, ORALLY DISINTEGRATING ORAL EVERY 8 HOURS PRN
Status: DISCONTINUED | OUTPATIENT
Start: 2023-07-13 | End: 2023-07-13 | Stop reason: HOSPADM

## 2023-07-13 RX ORDER — SODIUM CHLORIDE 9 MG/ML
INJECTION, SOLUTION INTRAVENOUS CONTINUOUS
Status: DISCONTINUED | OUTPATIENT
Start: 2023-07-13 | End: 2023-07-14 | Stop reason: HOSPADM

## 2023-07-13 RX ORDER — ACETAMINOPHEN 325 MG/1
650 TABLET ORAL EVERY 6 HOURS PRN
Status: DISCONTINUED | OUTPATIENT
Start: 2023-07-13 | End: 2023-07-13 | Stop reason: HOSPADM

## 2023-07-13 RX ORDER — POLYETHYLENE GLYCOL 3350 17 G/17G
17 POWDER, FOR SOLUTION ORAL DAILY PRN
Status: DISCONTINUED | OUTPATIENT
Start: 2023-07-13 | End: 2023-07-14 | Stop reason: HOSPADM

## 2023-07-13 RX ORDER — SODIUM CHLORIDE 9 MG/ML
INJECTION, SOLUTION INTRAVENOUS PRN
Status: DISCONTINUED | OUTPATIENT
Start: 2023-07-13 | End: 2023-07-14 | Stop reason: HOSPADM

## 2023-07-13 RX ORDER — ENOXAPARIN SODIUM 100 MG/ML
40 INJECTION SUBCUTANEOUS DAILY
Status: DISCONTINUED | OUTPATIENT
Start: 2023-07-14 | End: 2023-07-14 | Stop reason: HOSPADM

## 2023-07-13 RX ADMIN — TRIAMCINOLONE ACETONIDE: 5 OINTMENT TOPICAL at 20:04

## 2023-07-13 RX ADMIN — SODIUM CHLORIDE: 9 INJECTION, SOLUTION INTRAVENOUS at 22:30

## 2023-07-13 RX ADMIN — ENOXAPARIN SODIUM 40 MG: 100 INJECTION SUBCUTANEOUS at 07:44

## 2023-07-13 RX ADMIN — GADOTERIDOL 19 ML: 279.3 INJECTION, SOLUTION INTRAVENOUS at 16:09

## 2023-07-13 RX ADMIN — TRIAMCINOLONE ACETONIDE: 5 OINTMENT TOPICAL at 13:40

## 2023-07-13 RX ADMIN — SODIUM CHLORIDE, PRESERVATIVE FREE 10 ML: 5 INJECTION INTRAVENOUS at 07:44

## 2023-07-13 RX ADMIN — LEVETIRACETAM 500 MG: 500 TABLET, FILM COATED ORAL at 20:03

## 2023-07-13 RX ADMIN — SODIUM CHLORIDE: 9 INJECTION, SOLUTION INTRAVENOUS at 02:48

## 2023-07-13 RX ADMIN — LEVETIRACETAM 500 MG: 500 TABLET, FILM COATED ORAL at 12:54

## 2023-07-13 RX ADMIN — ACETAMINOPHEN 650 MG: 325 TABLET ORAL at 03:11

## 2023-07-13 RX ADMIN — LORAZEPAM 2 MG: 2 INJECTION INTRAMUSCULAR; INTRAVENOUS at 12:54

## 2023-07-13 ASSESSMENT — PAIN DESCRIPTION - LOCATION: LOCATION: GENERALIZED

## 2023-07-13 ASSESSMENT — PAIN SCALES - WONG BAKER: WONGBAKER_NUMERICALRESPONSE: 0

## 2023-07-13 ASSESSMENT — PAIN DESCRIPTION - DESCRIPTORS: DESCRIPTORS: ACHING

## 2023-07-13 ASSESSMENT — PAIN SCALES - GENERAL
PAINLEVEL_OUTOF10: 0
PAINLEVEL_OUTOF10: 4

## 2023-07-13 ASSESSMENT — PAIN - FUNCTIONAL ASSESSMENT: PAIN_FUNCTIONAL_ASSESSMENT: ACTIVITIES ARE NOT PREVENTED

## 2023-07-13 NOTE — H&P
[facility] History and Physical      CHIEF COMPLAINT:  Loss of consciousness    History of Present Illness: This is a 59-year-old male with no significant past medical history who presents to the ED with loss of consciousness. Patient states he was standing at the sink feeling nauseated, and lightheaded. His wife states she noticed he was sent over the kitchen sink and trembling. She assisted him to the kitchen floor. She states he did not hit his head. She states he was unconscious for several minutes and awoke confused. He states he does not remember anything until he was in the ambulance, but she states he regained consciousness while in the house. Patient states he has been having episodes of lightheadedness and nausea that are followed by diaphoresis for about one month. He states he had been feeling lightheaded and nauseated for a while prior to losing consciousness. Following with cardiology for lightheadedness episodes. Patient states his episodes are not precipitated by a prodrome, standing, walking, or position change. Denies any palpitations, shortness of breath, or chest pain. Recommended to the caffeine and alcohol, consider compression socks, and aggressively hydrate. Patient states he has been sealing the stone of his house with chemical spray and, per ED note, EMS did notice a chemical smell inside and outside the house. Vitas on admit: 7.9, 78, 16, 144/75, 96% on room air. Oxygen saturation to be 88 after admission, placed on 2 L nasal cannula. CTA negative for pulmonary embolism. CT of head negative for acute intracranial abnormality. Labs unremarkable. Poison control consulted regarding the chemical use by the ED physician and not felt to be causative. Recommended home be aired out prior to discharge. Patient admitted for further evaluation and treatment.       Informant(s) for H&P: patient and chart review    REVIEW OF SYSTEMS:  A comprehensive review

## 2023-07-13 NOTE — PLAN OF CARE
Problem: Discharge Planning  Goal: Discharge to home or other facility with appropriate resources  Outcome: Progressing  Flowsheets (Taken 7/13/2023 0215)  Discharge to home or other facility with appropriate resources: Identify barriers to discharge with patient and caregiver

## 2023-07-13 NOTE — PROGRESS NOTES
Patient's wife stated he has had two seizures this morning one at 6:45 and another one at 7:30. Educated family to place call light on when patient feels a seizure coming on, she verbalized understanding.

## 2023-07-13 NOTE — ED NOTES
ED to Inpatient Handoff Report    Notified earnestine that electronic handoff available and patient ready for transport to room 547. Safety Risks: None identified    Patient in Restraints: no    Constant Observer or Patient : no    Telemetry Monitoring Ordered :Yes           Order to transfer to unit without monitor:NO    Last MEWS: 1 Time completed: 0145    Vitals:    07/12/23 2300 07/13/23 0030 07/13/23 0130 07/13/23 0145   BP: (!) 150/83 (!) 150/83 (!) 151/87 (!) 155/79   Pulse: 97 87 77 74   Resp: 22 18 30 20   Temp:       TempSrc:       SpO2: 94% 98% 97% 97%   Weight:       Height:           Opportunity for questions and clarification was provided.         Nelida Alexis RN  07/13/23 6123

## 2023-07-13 NOTE — DISCHARGE SUMMARY
[facility] Physician Discharge Summary       No follow-up provider specified. Activity level: As tolerated     Dispo: [facility]    Condition on discharge: Stable     Patient ID:  Batool Esparza  [id number]  66 y.o.  1960    Admit date: 7/12/2023    Discharge date and time:  7/13/2023  11:35 AM    Admission Diagnoses: Principal Problem:    Syncope and collapse  Active Problems:    Poison oak    Hyperglycemia  Resolved Problems:    * No resolved hospital problems. *      Discharge Diagnoses: Principal Problem:    Syncope and collapse  Active Problems:    Poison oak    Hyperglycemia  Resolved Problems:    * No resolved hospital problems. *      Consults:  None    Hospital Course:   Patient Batool Esparza is a 66 y.o. gentleman with no significant past medical history, he presented to ER for syncopal episode evaluation. While in the hospital 2 episodes of seizure-like activity were noted, patient reports sour taste in mouth followed by jerking movement of bilateral upper extremity, he stares into space. Both episodes corrected spontaneously, he remained confused for about 2 to 3 minutes after which he was oriented to time place and person. He denies any headache, is complaining of lightheadedness. EKG within normal limit. CT head within normal limit. EEG ordered, MRI brain ordered. Case discussed with Dr. Ben Bullock, he recommends starting on Keppra 500 twice daily and transferred to [facility] for further evaluation, case was discussed with Lesli joy, nurse practitioner from hospital service who graciously excepted patient to hospital service. Plan was discussed with patient and wife in detail.      Discharge Exam:    General Appearance: alert and oriented to person, place and time and in no acute distress  Skin: warm and dry  Head: normocephalic and atraumatic  Eyes: pupils equal, round, and reactive to light, extraocular eye movements intact, conjunctivae normal  Neck: neck supple and non

## 2023-07-13 NOTE — PROGRESS NOTES
4 Eyes Skin Assessment     NAME:  Zully Wray OF BIRTH:  1960  MEDICAL RECORD NUMBER:  [id number]    The patient is being assessed for  Admission    I agree that at least one RN has performed a thorough Head to Toe Skin Assessment on the patient. ALL assessment sites listed below have been assessed. Areas assessed by both nurses:    Head, Face, Ears, Shoulders, Back, Chest, Arms, Elbows, Hands, Sacrum. Buttock, Coccyx, Ischium, Legs. Feet and Heels, and Under Medical Devices         Does the Patient have a Wound?  No noted wound(s)       Malcolm Prevention initiated by RN: No  Wound Care Orders initiated by RN: No    Pressure Injury (Stage 3,4, Unstageable, DTI, NWPT, and Complex wounds) if present, place Wound referral order by RN under : No    New Ostomies, if present place, Ostomy referral order under : No     Nurse 1 eSignature: Electronically signed by Fredonia Goldmann on 7/13/23 at 4:26 AM EDT    **SHARE this note so that the co-signing nurse can place an eSignature**    Nurse 2 eSignature: Electronically signed by Anne Marie Soria RN on 7/13/23 at 4:28 AM EDT

## 2023-07-13 NOTE — PROCEDURES
[address] Report    MRN: [id number]  PATIENT NAME: Colton Cooper  DATE OF REPORT: 2023   DATE OF SERVICE: 2023  PHYSICIAN NAME: Yoly Godinez DO  Referring Physician: Ernestina Farias MD      Patient's : 1960  Patient's Age: 66 y.o. Gender: male    PROCEDURE: Routine EEG with video      Clinical Interpretation: This was a normal study during waking and drowsiness. No seizures or epileptiform discharges were noted during this study. ____________________________  Electronically signed by: Yoly Godinez DO, 2023 10:53 AM      Patient Clinical Information   Reason for Study: Patient undergoing evaluation for suspected seizures  Patient State: Awake  Primary neurological diagnosis: Possible seizure  Primary indication for monitoring: Characterization of spells    Pertinent Medications and Treatments    Lorazepam     Sedatives administered: No  Intubated: No  Pharmacological paralytic: No    Reporting Period  Start of Study: 2023   End of Study:  2023       EEG Description  Digital video and scalp EEG monitoring was performed using the standard protocol for this laboratory. Scalp electrodes were applied in the international 10/20 system. Multiple digital montage arrangements were utilized for evaluation. EKG and video were recorded. Background:      Occipital rhythm (posterior dominant rhythm or PDR): Present  Frequency: 9 Hz  Voltage: Medium  Organization: good   Reactivity to eye opening/closure: good     Drowsiness: Present - normal  Sleep: Absent    Technical and Activation Procedures:  Hyperventilation: Not done  Photic stimulation: Not done  Reactivity to stimulation: Yes    Abnormalities:    I. Seizures? No    II. Rhythmic or Periodic Patterns? No    III. Other Abnormalities?   No

## 2023-07-13 NOTE — PLAN OF CARE
Problem: Discharge Planning  Goal: Discharge to home or other facility with appropriate resources  7/13/2023 0447 by Donna Reece  Outcome: Progressing  Flowsheets (Taken 7/13/2023 0215)  Discharge to home or other facility with appropriate resources: Identify barriers to discharge with patient and caregiver     Problem: Safety - Adult  Goal: Free from fall injury  Outcome: Progressing

## 2023-07-13 NOTE — ED NOTES
Spoke with poison control- Elizabeth, reviewed vital signs and testing. Patient ax0x4, per poison control, prior to return home, instruct patient to air out home from fumes d/t use of chemicals around home. Dr. Aretha Natarajan made aware of phone call.      Marquita Burnham RN  07/13/23 9648

## 2023-07-13 NOTE — SIGNIFICANT EVENT
To bedside to assess patient for possible seizure-like activity. RN stated patient began staring off into space and his eyes and right hand began twitching. Stated this lasted approximately 15 seconds and patient began responding, but was initially confused and appeared post-ictal. When I assessed several minutes after this episode, patient was alert and oriented. Stated he had a lightheaded episode and felt nauseated with a sour taste in his mouth. He stated after his lightheadedness passed, he could hear the RN, but could not comprehend what she was saying. No focal deficits noted.

## 2023-07-13 NOTE — PROGRESS NOTES
Spoke with chandler/EEG department at [facility] ([facility]) to notify of the order, they will call with time.

## 2023-07-13 NOTE — ED PROVIDER NOTES
[facility]        Pt Name: Batool Esparza  MRN: [id number]  [address] 1960  Date of evaluation: 7/12/2023  Provider: Annie Bose MD  PCP: Magdaleno Dinh MD  Note Started: 8:30 PM EDT 7/12/23    CHIEF COMPLAINT       Chief Complaint   Patient presents with    Loss of Consciousness     Patient passed out at home patient did not hit head wife lowered him to the ground, ems stated on arrival patinet was A&Ox0 GCS of 5, patients drive way was just sprayed and coated in chemicals and ems stated the whole house smelled like chemicals. On arrival here patient is GCS 15 A&Ox3       HISTORY OF PRESENT ILLNESS: 1 or more Elements   History From: Patient and wife    Limitations to history : None    Batool Esparza is a 66 y.o. male who presents to the emergency department with a syncopal event at home. History is provided by wife as patient does not remember the events after passing out. Patient states he has been having intermittent lightheaded episodes for the past months. Lightheadedness upon standing. Was seen by cardiology thought to be due to dehydration. Patient denies any recent chest pain or shortness of breath never had cardiac stress test.  Patient does have a history of hyperlipidemia. Patient went to urgent care today had poison ivy he was given steroid cream as well as steroid injection. Went home and while he was standing at the kitchen sink about an hour prior to arrival he began to feel lightheaded and dizzy. His wife said he slumped over in the sink she lowered him to the ground. He did not hit his head. Is not on blood thinners. He passed out. She was able to call EMS. She is not sure how long he was out at least 5 to 10 minutes. He then came to but does not recall the event he is alert and oriented x3 at this time.   This did note a chemical smell to the outside of the house and slightly

## 2023-07-13 NOTE — PROGRESS NOTES
MRI is not sure when they will be able to get to bring him down, ECHO will be around within the next hour.

## 2023-07-13 NOTE — PROGRESS NOTES
Patient had another seizure, stated he felt like something was in his throat and was more conscious this time. No noted other symptoms and it lasted 15seconds.  Keppra and IV ativan given

## 2023-07-13 NOTE — PROGRESS NOTES
Updated from [address], patient has a bed at [facility] ([facility]). Bed number 5860, Access will update about time for transportation.

## 2023-07-13 NOTE — DISCHARGE INSTR - COC
Continuity of Care Form    Patient Name: Sommer France   :  1960  MRN:  [id number]    Admit date:  2023  Discharge date:  ***    Code Status Order: Prior   Advance Directives:     Admitting Physician:  No admitting provider for patient encounter.   PCP: Brandon Christopher MD    Discharging Nurse: [facility] Unit/Room#:   Discharging Unit Phone Number: ***    Emergency Contact:   Extended Emergency Contact Information  Primary Emergency Contact: Tereza Santillan  Address: [address] [place] [address] , [address] [place] [id number] [address] Phone: [phone]  Work Phone: [phone]  Mobile Phone: [phone]  Relation: Spouse    Past Surgical History:  Past Surgical History:   Procedure Laterality Date    COLONOSCOPY      NOSE SURGERY      deviated septum     OTHER SURGICAL HISTORY  2017    excision perianal skin lesion    PILONIDAL CYST EXCISION      WISDOM TOOTH EXTRACTION         Immunization History:   Immunization History   Administered Date(s) Administered    COVID-19, [place] Vaccine, Vaccine Unspecified 2021, 2021    Influenza Virus Vaccine 2022    Influenza, FLUARIX, FLULAVAL, FLUZONE (age 10 mo+) AND AFLURIA, (age 1 y+), PF, 0.5mL 2020, 2022    Influenza, FLUCELVAX, (age 10 mo+), MDCK, PF, 0.5mL 2021    Zoster Recombinant (Shingrix) 2018, 2018       Active Problems:  Patient Active Problem List   Diagnosis Code    Exostosis of bone of foot M89.8X7    Hyperlipidemia E78.5    Diverticular disease of colon K57.30    Generalized anxiety disorder F41.1       Isolation/Infection:   Isolation            No Isolation          Patient Infection Status       Infection Onset Added Last Indicated Last Indicated By Review Planned Expiration Resolved Resolved By    None active    Resolved    COVID-19 (Rule Out) 21 COVID-19 Ambulatory (Ordered)   05/15/21 Rule-Out Test Resulted            Nurse

## 2023-07-14 VITALS
DIASTOLIC BLOOD PRESSURE: 74 MMHG | OXYGEN SATURATION: 95 % | WEIGHT: 213 LBS | HEIGHT: 72 IN | SYSTOLIC BLOOD PRESSURE: 124 MMHG | HEART RATE: 62 BPM | TEMPERATURE: 97.2 F | BODY MASS INDEX: 28.85 KG/M2 | RESPIRATION RATE: 16 BRPM

## 2023-07-14 LAB
ANION GAP SERPL CALCULATED.3IONS-SCNC: 11 MMOL/L (ref 7–16)
BASOPHILS # BLD: 0.04 E9/L (ref 0–0.2)
BASOPHILS NFR BLD: 0.7 % (ref 0–2)
BUN SERPL-MCNC: 16 MG/DL (ref 6–23)
CALCIUM SERPL-MCNC: 8.6 MG/DL (ref 8.6–10.2)
CHLORIDE SERPL-SCNC: 108 MMOL/L (ref 98–107)
CO2 SERPL-SCNC: 22 MMOL/L (ref 22–29)
CREAT SERPL-MCNC: 0.9 MG/DL (ref 0.7–1.2)
EOSINOPHIL # BLD: 0.15 E9/L (ref 0.05–0.5)
EOSINOPHIL NFR BLD: 2.5 % (ref 0–6)
ERYTHROCYTE [DISTWIDTH] IN BLOOD BY AUTOMATED COUNT: 12.2 FL (ref 11.5–15)
GLUCOSE SERPL-MCNC: 91 MG/DL (ref 74–99)
HCT VFR BLD AUTO: 40 % (ref 37–54)
HGB BLD-MCNC: 13.4 G/DL (ref 12.5–16.5)
IMM GRANULOCYTES # BLD: 0.02 E9/L
IMM GRANULOCYTES NFR BLD: 0.3 % (ref 0–5)
LYMPHOCYTES # BLD: 2.36 E9/L (ref 1.5–4)
LYMPHOCYTES NFR BLD: 38.9 % (ref 20–42)
MCH RBC QN AUTO: 31.5 PG (ref 26–35)
MCHC RBC AUTO-ENTMCNC: 33.5 % (ref 32–34.5)
MCV RBC AUTO: 93.9 FL (ref 80–99.9)
MONOCYTES # BLD: 0.51 E9/L (ref 0.1–0.95)
MONOCYTES NFR BLD: 8.4 % (ref 2–12)
NEUTROPHILS # BLD: 2.99 E9/L (ref 1.8–7.3)
NEUTS SEG NFR BLD: 49.2 % (ref 43–80)
PLATELET # BLD AUTO: 187 E9/L (ref 130–450)
PMV BLD AUTO: 10 FL (ref 7–12)
POTASSIUM SERPL-SCNC: 4.2 MMOL/L (ref 3.5–5)
RBC # BLD AUTO: 4.26 E12/L (ref 3.8–5.8)
SODIUM SERPL-SCNC: 141 MMOL/L (ref 132–146)
WBC # BLD: 6.1 E9/L (ref 4.5–11.5)

## 2023-07-14 PROCEDURE — 99222 1ST HOSP IP/OBS MODERATE 55: CPT | Performed by: PSYCHIATRY & NEUROLOGY

## 2023-07-14 PROCEDURE — 80048 BASIC METABOLIC PNL TOTAL CA: CPT

## 2023-07-14 PROCEDURE — 6360000002 HC RX W HCPCS: Performed by: NURSE PRACTITIONER

## 2023-07-14 PROCEDURE — 2580000003 HC RX 258: Performed by: NURSE PRACTITIONER

## 2023-07-14 PROCEDURE — 36415 COLL VENOUS BLD VENIPUNCTURE: CPT

## 2023-07-14 PROCEDURE — 6370000000 HC RX 637 (ALT 250 FOR IP): Performed by: NURSE PRACTITIONER

## 2023-07-14 PROCEDURE — 85025 COMPLETE CBC W/AUTO DIFF WBC: CPT

## 2023-07-14 PROCEDURE — 97165 OT EVAL LOW COMPLEX 30 MIN: CPT

## 2023-07-14 PROCEDURE — 97161 PT EVAL LOW COMPLEX 20 MIN: CPT

## 2023-07-14 RX ORDER — LEVETIRACETAM 500 MG/1
500 TABLET ORAL 2 TIMES DAILY
Qty: 60 TABLET | Refills: 3 | Status: SHIPPED | OUTPATIENT
Start: 2023-07-14

## 2023-07-14 RX ORDER — LORAZEPAM 1 MG/1
1 TABLET ORAL EVERY 8 HOURS PRN
Qty: 6 TABLET | Refills: 0 | Status: SHIPPED | OUTPATIENT
Start: 2023-07-14 | End: 2023-08-13

## 2023-07-14 RX ADMIN — SODIUM CHLORIDE, PRESERVATIVE FREE 10 ML: 5 INJECTION INTRAVENOUS at 08:07

## 2023-07-14 RX ADMIN — ENOXAPARIN SODIUM 40 MG: 100 INJECTION SUBCUTANEOUS at 08:05

## 2023-07-14 RX ADMIN — LEVETIRACETAM 500 MG: 500 TABLET, FILM COATED ORAL at 08:05

## 2023-07-14 ASSESSMENT — PAIN SCALES - WONG BAKER
WONGBAKER_NUMERICALRESPONSE: 0
WONGBAKER_NUMERICALRESPONSE: 0

## 2023-07-14 ASSESSMENT — PAIN SCALES - GENERAL: PAINLEVEL_OUTOF10: 0

## 2023-07-14 NOTE — PROGRESS NOTES
4 Eyes Skin Assessment     NAME:  Contreras Carter OF BIRTH:  1960  MEDICAL RECORD NUMBER:  [id number]    The patient is being assessed for  Admission    I agree that at least one RN has performed a thorough Head to Toe Skin Assessment on the patient. ALL assessment sites listed below have been assessed. Areas assessed by both nurses:    Head, Face, Ears, Shoulders, Back, Chest, Arms, Elbows, Hands, Sacrum. Buttock, Coccyx, Ischium, Legs. Feet and Heels, and Under Medical Devices         Does the Patient have a Wound?  No noted wound(s)       Malcolm Prevention initiated by RN: No  Wound Care Orders initiated by RN: No    Pressure Injury (Stage 3,4, Unstageable, DTI, NWPT, and Complex wounds) if present, place Wound referral order by RN under : No    New Ostomies, if present place, Ostomy referral order under : No     Nurse 1 eSignature: Electronically signed by Vadim Banks RN on 7/14/23 at 5:42 AM EDT    **SHARE this note so that the co-signing nurse can place an eSignature**    Nurse 2 eSignature: Electronically signed by Aimee Sigala RN on 7/14/23 at 5:44 AM EDT
CLINICAL PHARMACY NOTE: MEDS TO BEDS    Total # of Prescriptions Filled: 1   The following medications were delivered to the patient:  Keppra 500mg    Additional Documentation:  Patient picked up in pharmacy 7-14-23
Paged Dr. Angelo Koch for a discharge order
[place] [address] [address], [place]     [id number]  Patient Name: Esteban Sánchez  MRN: [id number]  : 1960  Room: [address] [place] [address]      Evaluating OT: Jackie Moreno OTD, OTR/L, [id number]    Referring Provider[de-identified] GRISEL Farris CNP     Specific Provider Orders/Date: OT eval and treat (23)    AM-PAC Daily Activity Raw Score:     Recommended Adaptive Equipment: None     Diagnosis: New onset seizure ([address]) [R56.9]  Pertinent Medical History: has a past medical history of Skin lesion. Precautions:  NA     Assessment of current deficits   [] Functional mobility   []ADLs  [] Strength               []Cognition   [] Functional transfers   [] IADLs         [] Safety Awareness   []Endurance   [] Fine Coordination              [] Balance      [] Vision/perception   []Sensation    []Gross Motor Coordination  [] ROM  [] Delirium                   [] Motor Control     OT PLAN OF CARE   OT POC based on physician orders, patient diagnosis and results of clinical assessment    Frequency/Duration : NA  Specific OT Treatment to include: NA      Home Living: Pt lives with wife in a 1 story home with 3 step(s) to enter; bed/bath on main level   Bathroom setup: walk-in shower with grab bars and built in shower seat  Equipment owned: None  Prior Level of Function: Independent  with ADLs , Independent  with IADLs; using no AD for ambulation.    Driving: No- pt was  for work; does not drive following episodes- works in [employer]    Pain Level: 0/10 reported  Cognition: A&O: 4/4; Follows multi step directions   Memory:  good    Sequencing:  good    Problem solving:  good    Judgement/safety:  good      Functional Assessment:   Initial Eval Status  Date: 23   Feeding Independent      Grooming Independent   Standing at sink   UB Dressing Independent      LB Dressing Independent   To don pants at EOB
services. Pt is agreeable that there are no skilled PT needs at this time. Will complete order and remove pt from PT caseload. Please re-consult if pt experiences functional decline. Thank you. Referring provider/PT Order:    Start   Ordering Provider    07/13/23 2200  PT evaluation and treat  Start:  07/13/23 2200,   End:  07/13/23 2200,   ONE TIME,   Standing Count:  1 Occurrences,   R         Eli Messer, APRN - CNP      Diagnosis:  New onset seizure ([address]) [R56.9]    Time in  0816  Time out  0826    Total Treatment Time  0 minutes     Evaluation Time includes thorough review of current medical information, gathering information on past medical history/social history and prior level of function, completion of standardized testing/informal observation of tasks, assessment of data and education on plan of care and goals.     CPT codes:  [x] Low Complexity PT evaluation 28170  [] Moderate Complexity PT evaluation 79220  [] High Complexity PT evaluation 33347  [] PT Re-evaluation 00897  [] Gait training 41032 0 minutes  [] Manual therapy 09914 0 minutes  [] Therapeutic activities 00629 0 minutes  [] Therapeutic exercises 07123 0 minutes  [] Neuromuscular reeducation 45305 0 minutes     Erin Lebron, PT, DPT  [id number]

## 2023-07-14 NOTE — H&P
[facility]  History and Physical      CHIEF COMPLAINT:    No chief complaint on file. Patient of Juan Ramon Hoyt MD presents with:  New onset seizure [facility])    History of Present Illness:   Patient is a 62-year-old male with a past medical history of perianal skin lesion. Patient presented to [facility] ED with complaints of loss of consciousness. Patient states he was standing at the sink feeling nauseated and lightheaded his way noticed he was traveling. She assisted patient to the kitchen floor. Patient did not hit his head. She admits that patient was unconscious for several minutes and woke up confused. Patient was admitted to telemetry unit. During his inpatient stay patient was RRT and for seizure-like activity. Attending spoke with neurology and they requested patient to be transferred to [facility] and undergo MRI and EEG. Patient was started on Keppra 500 mg twice daily and is now admitted to telemetry unit for further treatment. REVIEW OF SYSTEMS:  Pertinent negatives are above in HPI. 10 point ROS otherwise negative. Past Medical History:   Diagnosis Date    Skin lesion     perianal         Past Surgical History:   Procedure Laterality Date    COLONOSCOPY      NOSE SURGERY      deviated septum     OTHER SURGICAL HISTORY  02/07/2017    excision perianal skin lesion    PILONIDAL CYST EXCISION      WISDOM TOOTH EXTRACTION         Medications Prior to Admission:    Medications Prior to Admission: Vitamin D (CHOLECALCIFEROL) 25 MCG (1000 UT) TABS tablet, Take 2 tablets by mouth every 48 hours  ammonium lactate (LAC-HYDRIN) 12 % lotion, Apply topically daily. Note that the patient's home medications were reviewed and the above list is accurate to the best of my knowledge at the time of the exam.    Allergies:    Patient has no known allergies. Social History:    reports that he has quit smoking. His smoking use included cigars.  He

## 2023-07-14 NOTE — CARE COORDINATION
Transferred Here from [facility] a seizure. Neurology consult and PT /OT. Echo 65%. Pt eval 24/24  Ot 24/24  MRI-. Mild chronic microvascular ischemic changes. Met with patient and wife at bedside to discuss role/transition of care. They live in  ranch home and he has been independent PTA. His PCP is Dr Josiane Orlando and Located within [facility] or [facility]. Discharge plan is home and his wife will transport.  Electronically signed by Soy Danielson RN on 7/14/2023 at 10:52 AM

## 2023-07-14 NOTE — ACP (ADVANCE CARE PLANNING)
Advance Care Planning   Healthcare Decision Maker:    Primary Decision Maker: Terrance Saleh [place] - [phone]    Click here to complete Healthcare Decision Makers including selection of the Healthcare Decision Maker Relationship (ie \"Primary\").

## 2023-07-17 ENCOUNTER — TELEPHONE (OUTPATIENT)
Dept: FAMILY MEDICINE CLINIC | Age: 63
End: 2023-07-17

## 2023-07-17 NOTE — TELEPHONE ENCOUNTER
Care Transitions Initial Follow Up Call    Outreach made within 2 business days of discharge: Yes    Patient: Oz Billingsley Patient : 1960   MRN: [id number]  Reason for Admission: New onset seizure   Discharge Date: 23       Spoke with: Axel Ayala     Discharge department/facility: [facility] Interactive Patient Contact:  Was patient able to fill all prescriptions: Yes  Was patient instructed to bring all medications to the follow-up visit: Yes  Is patient taking all medications as directed in the discharge summary?  Yes  Does patient understand their discharge instructions: Yes  Does patient have questions or concerns that need addressed prior to 7-14 day follow up office visit: no    Scheduled appointment with PCP within 7-14 days    Follow Up  Future Appointments   Date Time Provider [address]   2023 11:00 AM Carlo Smith [facility]   10/17/2023  3:30 PM Carlo Smith MD [address] [place] [address]

## 2023-07-18 NOTE — PROGRESS NOTES
Physician Progress Note      Norma Graves  CSN #:                  [id number]  :                       1960  ADMIT DATE:       2023 7:41 PM  [address] [facility] DATE:        2023 9:09 PM  RESPONDING  PROVIDER #:        Stacia Cogan MD          QUERY TEXT:    Patient admitted with syncope. If possible, please document in progress notes   and discharge summary after study the likely etiology of the syncope: The medical record reflects the following:  Risk Factors: Syncope  Clinical Indicators: DC sum \"While in the hospital 2 episodes of seizure-like   activity were noted, ? sour taste in mouth followed by jerking movement of   bilateral upper extremity, he stares into space. Both episodes corrected   spontaneously. \"  H&P \"Syncope over seizure-monitor\"  Treatment: Keppra; IV hydration; Tele unit; CT head; transfer to neurology at   [facility]  Options provided:  -- Syncope likely due to seizure  -- Syncope due to, Please specify cause. -- Other - I will add my own diagnosis  -- Disagree - Not applicable / Not valid  -- Disagree - Clinically unable to determine / Unknown  -- Refer to Clinical Documentation Reviewer    PROVIDER RESPONSE TEXT:    The syncope is likely due to seizure.     Query created by: Omar Werner on 2023 1:05 PM      Electronically signed by:  Stacia Cogan MD 2023 11:11 AM

## 2023-07-20 ENCOUNTER — TELEPHONE (OUTPATIENT)
Dept: NEUROLOGY | Age: 63
End: 2023-07-20

## 2023-07-20 ENCOUNTER — OFFICE VISIT (OUTPATIENT)
Dept: FAMILY MEDICINE CLINIC | Age: 63
End: 2023-07-20

## 2023-07-20 ENCOUNTER — OFFICE VISIT (OUTPATIENT)
Dept: NEUROLOGY | Age: 63
End: 2023-07-20
Payer: COMMERCIAL

## 2023-07-20 VITALS
BODY MASS INDEX: 26.18 KG/M2 | HEART RATE: 80 BPM | OXYGEN SATURATION: 100 % | RESPIRATION RATE: 22 BRPM | DIASTOLIC BLOOD PRESSURE: 80 MMHG | SYSTOLIC BLOOD PRESSURE: 136 MMHG | TEMPERATURE: 97.7 F | WEIGHT: 204 LBS | HEIGHT: 74 IN

## 2023-07-20 VITALS
TEMPERATURE: 97.6 F | HEIGHT: 74 IN | SYSTOLIC BLOOD PRESSURE: 149 MMHG | DIASTOLIC BLOOD PRESSURE: 85 MMHG | HEART RATE: 63 BPM | OXYGEN SATURATION: 96 % | WEIGHT: 202 LBS | BODY MASS INDEX: 25.93 KG/M2 | RESPIRATION RATE: 18 BRPM

## 2023-07-20 DIAGNOSIS — L23.7 POISON IVY: ICD-10-CM

## 2023-07-20 DIAGNOSIS — R56.9 SEIZURE (HCC): Primary | ICD-10-CM

## 2023-07-20 DIAGNOSIS — Z09 HOSPITAL DISCHARGE FOLLOW-UP: ICD-10-CM

## 2023-07-20 DIAGNOSIS — R56.9 NEW ONSET SEIZURE (HCC): Primary | ICD-10-CM

## 2023-07-20 PROCEDURE — 99214 OFFICE O/P EST MOD 30 MIN: CPT

## 2023-07-20 RX ORDER — PREDNISONE 10 MG/1
TABLET ORAL
Qty: 30 TABLET | Refills: 0 | Status: SHIPPED | OUTPATIENT
Start: 2023-07-20

## 2023-07-20 RX ORDER — CETIRIZINE HYDROCHLORIDE 10 MG/1
10 TABLET ORAL DAILY
COMMUNITY
Start: 2023-07-12

## 2023-07-20 NOTE — PROGRESS NOTES
Post-Discharge Transitional Care  Follow Up      Viji Feliciano   YOB: 1960    Date of Office Visit:  7/20/2023  Date of Hospital Admission: 7/13/23  Date of Hospital Discharge: 7/14/23  Risk of hospital readmission (high >=14%. Medium >=10%) :Readmission Risk Score: 5.2      Care management risk score Rising risk (score 2-5) and Complex Care (Scores >=6): No Risk Score On File     Non face to face  following discharge, date last encounter closed (first attempt may have been earlier): 07/17/2023    Call initiated 2 business days of discharge: Yes    ASSESSMENT/PLAN:   Seizure ([address])  - uncertain as to cause   - EEG (7/2023) - normal no seizure activiyt noted   - MRI (7/2023) - limited by motion no acute abnormality, mild chronic microvascular changes   - following with neurology   - placed on keppra   - placed on ativan prn seizure activity   - seizure precautions - no driving   - for home EEG (7/2023)   - for repeat MRI 3-6 months     Poison ivy  - treated with depo medrol   - given antihistamine   - given anti itch cream   - will add prednisone taper     Medical Decision Making: moderate complexity    Return in 3 months (on 10/20/2023). Orders Placed This Encounter   Procedures    MA DISCHARGE MEDS RECONCILED W/ CURRENT OUTPATIENT MED LIST     Requested Prescriptions     Signed Prescriptions Disp Refills    predniSONE (DELTASONE) 10 MG tablet 30 tablet 0     Sig: Take 4 tabs x 3 days, 3 tabs x 3 days, 2 tabs x 3 days, 1 tab x 3 days, stop. Subjective:   HPI:  Follow up of Hospital problems/diagnosis(es): passing out     Inpatient course: Discharge summary reviewed- see chart. Interval history/Current status:     States was admitted to hospital (7/23) -syncopal episode, in the ER noted seizure-like activity jerking movements staring into space 2 to 3 minutes with some confusion CT head negative, ordered EEG (7/2023) - normal no seizure.  MRI (7/2023) - limited by motion no acute

## 2023-07-20 NOTE — PROGRESS NOTES
It is important to get enough sleep (at least 7 hrs a night).   Increase EXERCISE, eat a healthy diet (5 fruits and/or vegetables a day), stay hydrated,  take a multivitamin, take fish/krill oil capsules, learn something new, avoid excessive caffeine, avoid 5/5   XI: sternocleidomastoid strength    XI: neck extension strength     XII: tongue strength  Normal     Motor:  5/5 throughout  Normal bulk and tone  No drift   No abnormal movements    Sensory:  LT and PP normal  Vibration normal    Coordination:   FN, FFM and DARELL normal  HS normal    Gait:  Normal  Romberg's negative    DTR:   2+ throughout    No Babinskis  No Grimaldo's    No other pathological reflexes    Laboratory/Radiology:     CBC with Differential:    Lab Results   Component Value Date/Time    WBC 6.1 07/14/2023 05:43 AM    RBC 4.26 07/14/2023 05:43 AM    HGB 13.4 07/14/2023 05:43 AM    HCT 40.0 07/14/2023 05:43 AM     07/14/2023 05:43 AM    MCV 93.9 07/14/2023 05:43 AM    MCH 31.5 07/14/2023 05:43 AM    MCHC 33.5 07/14/2023 05:43 AM    RDW 12.2 07/14/2023 05:43 AM    SEGSPCT 64 07/23/2011 04:45 AM    LYMPHOPCT 38.9 07/14/2023 05:43 AM    MONOPCT 8.4 07/14/2023 05:43 AM    BASOPCT 0.7 07/14/2023 05:43 AM    MONOSABS 0.51 07/14/2023 05:43 AM    LYMPHSABS 2.36 07/14/2023 05:43 AM    EOSABS 0.15 07/14/2023 05:43 AM    BASOSABS 0.04 07/14/2023 05:43 AM     CMP:    Lab Results   Component Value Date/Time     07/14/2023 05:43 AM    K 4.2 07/14/2023 05:43 AM     07/14/2023 05:43 AM    CO2 22 07/14/2023 05:43 AM    BUN 16 07/14/2023 05:43 AM    CREATININE 0.9 07/14/2023 05:43 AM    GFRAA >60 04/22/2021 09:19 AM    LABGLOM >60 07/14/2023 05:43 AM    GLUCOSE 91 07/14/2023 05:43 AM    GLUCOSE 90 07/23/2011 04:45 AM    PROT 6.7 07/13/2023 05:43 AM    LABALBU 4.2 07/13/2023 05:43 AM    LABALBU 4.2 07/22/2011 04:55 PM    CALCIUM 8.6 07/14/2023 05:43 AM    BILITOT 0.7 07/13/2023 05:43 AM    ALKPHOS 50 07/13/2023 05:43 AM    AST 40 07/13/2023 05:43 AM    ALT 27 07/13/2023 05:43 AM     CT head  Negative for acute abnormalities    MRI brain  Negative for acute abnormalities    EEG  This was a normal study during waking and drowsiness. No seizures or epileptiform discharges were noted during this study.

## 2023-07-28 ENCOUNTER — TELEPHONE (OUTPATIENT)
Dept: PRIMARY CARE CLINIC | Age: 63
End: 2023-07-28

## 2023-07-28 DIAGNOSIS — R56.9 NEW ONSET SEIZURE (HCC): Primary | ICD-10-CM

## 2023-07-28 NOTE — TELEPHONE ENCOUNTER
Wife calling to give name for second opinion. From [facility] and specializes in epilepsy and seizures. Dr. Donna Caldwell  Ph. [phone]  Fax.  [phone]

## 2023-07-28 NOTE — TELEPHONE ENCOUNTER
Orders Placed This Encounter   Procedures    External Referral To Neurology     Referral Priority:   Routine     Referral Type:   Eval and Treat     Referral Reason:   Specialty Services Required     Requested Specialty:   Neurology     Number of Visits Requested:   1     Referral placed

## 2023-08-07 ENCOUNTER — TELEPHONE (OUTPATIENT)
Dept: PRIMARY CARE CLINIC | Age: 63
End: 2023-08-07

## 2023-08-07 RX ORDER — BETAMETHASONE DIPROPIONATE 0.05 %
OINTMENT (GRAM) TOPICAL
Qty: 15 G | Refills: 0 | Status: SHIPPED | OUTPATIENT
Start: 2023-08-07

## 2023-08-07 NOTE — TELEPHONE ENCOUNTER
Patient informed, he will try cream first and will call back for referral if he does not see improvement.

## 2023-08-07 NOTE — TELEPHONE ENCOUNTER
You prescribed the pt a steroid for allergies at his appointment with you 07/20 but as soon as he was done taking them he broke out in the hives in blisters again, he is asking if something else can be prescribed

## 2023-08-07 NOTE — TELEPHONE ENCOUNTER
Requested Prescriptions     Signed Prescriptions Disp Refills    betamethasone dipropionate 0.05 % ointment 15 g 0     Sig: Apply topically daily.      Authorizing Provider: Jaun Garrido in steroid cream to try    Would consider referral to dermatology for evaluation as well

## 2023-10-28 ENCOUNTER — OFFICE VISIT (OUTPATIENT)
Dept: FAMILY MEDICINE CLINIC | Age: 63
End: 2023-10-28
Payer: COMMERCIAL

## 2023-10-28 VITALS
HEART RATE: 99 BPM | WEIGHT: 204 LBS | SYSTOLIC BLOOD PRESSURE: 128 MMHG | HEIGHT: 74 IN | OXYGEN SATURATION: 94 % | TEMPERATURE: 97.5 F | DIASTOLIC BLOOD PRESSURE: 84 MMHG | BODY MASS INDEX: 26.18 KG/M2

## 2023-10-28 DIAGNOSIS — J32.9 SINOBRONCHITIS: Primary | ICD-10-CM

## 2023-10-28 DIAGNOSIS — J40 SINOBRONCHITIS: Primary | ICD-10-CM

## 2023-10-28 DIAGNOSIS — R00.0 TACHYCARDIA: ICD-10-CM

## 2023-10-28 DIAGNOSIS — R06.00 DYSPNEA, UNSPECIFIED TYPE: ICD-10-CM

## 2023-10-28 LAB
Lab: NORMAL
PERFORMING INSTRUMENT: NORMAL
QC PASS/FAIL: NORMAL
SARS-COV-2, POC: NORMAL

## 2023-10-28 PROCEDURE — 87426 SARSCOV CORONAVIRUS AG IA: CPT | Performed by: NURSE PRACTITIONER

## 2023-10-28 PROCEDURE — 93000 ELECTROCARDIOGRAM COMPLETE: CPT | Performed by: NURSE PRACTITIONER

## 2023-10-28 PROCEDURE — 99213 OFFICE O/P EST LOW 20 MIN: CPT | Performed by: NURSE PRACTITIONER

## 2023-10-28 RX ORDER — LEVETIRACETAM 750 MG/1
750 TABLET ORAL 2 TIMES DAILY
COMMUNITY
Start: 2023-09-11

## 2023-10-28 RX ORDER — METHYLPREDNISOLONE 4 MG/1
TABLET ORAL
Qty: 1 KIT | Refills: 0 | Status: SHIPPED | OUTPATIENT
Start: 2023-10-28

## 2023-10-28 RX ORDER — BENZONATATE 100 MG/1
100 CAPSULE ORAL 3 TIMES DAILY PRN
Qty: 30 CAPSULE | Refills: 0 | Status: SHIPPED | OUTPATIENT
Start: 2023-10-28 | End: 2023-11-07

## 2023-10-28 RX ORDER — ALBUTEROL SULFATE 90 UG/1
2 AEROSOL, METERED RESPIRATORY (INHALATION) EVERY 4 HOURS PRN
Qty: 18 G | Refills: 0 | Status: SHIPPED | OUTPATIENT
Start: 2023-10-28

## 2023-10-28 RX ORDER — MULTIVITAMIN WITH IRON
TABLET ORAL
COMMUNITY
Start: 2023-09-11